# Patient Record
Sex: MALE | Race: WHITE | Employment: FULL TIME | URBAN - METROPOLITAN AREA
[De-identification: names, ages, dates, MRNs, and addresses within clinical notes are randomized per-mention and may not be internally consistent; named-entity substitution may affect disease eponyms.]

---

## 2017-03-24 ENCOUNTER — HOSPITAL ENCOUNTER (OUTPATIENT)
Age: 52
Discharge: HOME | End: 2017-03-24
Payer: COMMERCIAL

## 2017-03-24 ENCOUNTER — HOSPITAL ENCOUNTER (OUTPATIENT)
Dept: GENERAL RADIOLOGY | Age: 52
Discharge: HOME | End: 2017-03-24
Payer: COMMERCIAL

## 2017-03-24 DIAGNOSIS — J98.11 ATELECTASIS: ICD-10-CM

## 2017-03-24 DIAGNOSIS — M16.11 OSTEOARTHRITIS OF RIGHT HIP, UNSPECIFIED OSTEOARTHRITIS TYPE: ICD-10-CM

## 2017-03-24 DIAGNOSIS — M47.816 OSTEOARTHRITIS OF LUMBAR SPINE, UNSPECIFIED SPINAL OSTEOARTHRITIS COMPLICATION STATUS: ICD-10-CM

## 2017-03-24 LAB
ESTIMATED AVERAGE GLUCOSE: 111 MG/DL
HBA1C MFR BLD: 5.5 % (ref 4–6)
INSULIN COMMENT: NORMAL
INSULIN REFERENCE RANGE:: NORMAL
INSULIN: 21.4 MU/L
PROSTATE SPECIFIC ANTIGEN: 0.43 UG/L
THYROXINE, FREE: 1.4 NG/DL (ref 0.93–1.7)
TSH SERPL DL<=0.05 MIU/L-ACNC: 1.42 MIU/L (ref 0.3–5)

## 2017-03-24 PROCEDURE — 72100 X-RAY EXAM L-S SPINE 2/3 VWS: CPT

## 2017-03-24 PROCEDURE — 36415 COLL VENOUS BLD VENIPUNCTURE: CPT

## 2017-03-24 PROCEDURE — 84443 ASSAY THYROID STIM HORMONE: CPT

## 2017-03-24 PROCEDURE — 73502 X-RAY EXAM HIP UNI 2-3 VIEWS: CPT

## 2017-03-24 PROCEDURE — 83525 ASSAY OF INSULIN: CPT

## 2017-03-24 PROCEDURE — 71020 XR CHEST STANDARD TWO VW: CPT

## 2017-03-24 PROCEDURE — 83036 HEMOGLOBIN GLYCOSYLATED A1C: CPT

## 2017-03-24 PROCEDURE — 84439 ASSAY OF FREE THYROXINE: CPT

## 2017-03-24 PROCEDURE — 84153 ASSAY OF PSA TOTAL: CPT

## 2017-05-18 ENCOUNTER — HOSPITAL ENCOUNTER (OUTPATIENT)
Age: 52
Setting detail: OUTPATIENT SURGERY
Discharge: HOME | End: 2017-05-18
Attending: SURGERY | Admitting: SURGERY
Payer: COMMERCIAL

## 2017-05-18 VITALS
BODY MASS INDEX: 43.9 KG/M2 | OXYGEN SATURATION: 97 % | TEMPERATURE: 98.6 F | DIASTOLIC BLOOD PRESSURE: 72 MMHG | RESPIRATION RATE: 16 BRPM | HEIGHT: 70 IN | WEIGHT: 306.66 LBS | SYSTOLIC BLOOD PRESSURE: 124 MMHG | HEART RATE: 68 BPM

## 2017-05-18 PROCEDURE — 6360000002 HC RX W HCPCS: Performed by: SURGERY

## 2017-05-18 PROCEDURE — 99152 MOD SED SAME PHYS/QHP 5/>YRS: CPT | Performed by: SURGERY

## 2017-05-18 PROCEDURE — 88305 TISSUE EXAM BY PATHOLOGIST: CPT

## 2017-05-18 PROCEDURE — 2580000003 HC RX 258: Performed by: SURGERY

## 2017-05-18 PROCEDURE — 99153 MOD SED SAME PHYS/QHP EA: CPT | Performed by: SURGERY

## 2017-05-18 PROCEDURE — 3609010300 HC COLONOSCOPY W/BIOPSY SINGLE/MULTIPLE: Performed by: SURGERY

## 2017-05-18 PROCEDURE — 7100000010 HC PHASE II RECOVERY - FIRST 15 MIN: Performed by: SURGERY

## 2017-05-18 PROCEDURE — 7100000011 HC PHASE II RECOVERY - ADDTL 15 MIN: Performed by: SURGERY

## 2017-05-18 RX ORDER — FENTANYL CITRATE 50 UG/ML
INJECTION, SOLUTION INTRAMUSCULAR; INTRAVENOUS PRN
Status: DISCONTINUED | OUTPATIENT
Start: 2017-05-18 | End: 2017-05-18 | Stop reason: HOSPADM

## 2017-05-18 RX ORDER — MIDAZOLAM HYDROCHLORIDE 1 MG/ML
INJECTION INTRAMUSCULAR; INTRAVENOUS PRN
Status: DISCONTINUED | OUTPATIENT
Start: 2017-05-18 | End: 2017-05-18 | Stop reason: HOSPADM

## 2017-05-18 RX ORDER — SODIUM CHLORIDE, SODIUM LACTATE, POTASSIUM CHLORIDE, CALCIUM CHLORIDE 600; 310; 30; 20 MG/100ML; MG/100ML; MG/100ML; MG/100ML
INJECTION, SOLUTION INTRAVENOUS ONCE
Status: COMPLETED | OUTPATIENT
Start: 2017-05-18 | End: 2017-05-18

## 2017-05-18 RX ADMIN — SODIUM CHLORIDE, POTASSIUM CHLORIDE, SODIUM LACTATE AND CALCIUM CHLORIDE: 600; 310; 30; 20 INJECTION, SOLUTION INTRAVENOUS at 08:48

## 2017-05-18 ASSESSMENT — PAIN - FUNCTIONAL ASSESSMENT: PAIN_FUNCTIONAL_ASSESSMENT: 0-10

## 2017-05-19 LAB — SURGICAL PATHOLOGY REPORT: NORMAL

## 2017-06-20 ENCOUNTER — HOSPITAL ENCOUNTER (OUTPATIENT)
Age: 52
Discharge: HOME | End: 2017-06-20
Payer: COMMERCIAL

## 2017-06-20 PROCEDURE — 82397 CHEMILUMINESCENT ASSAY: CPT

## 2017-06-20 PROCEDURE — 86140 C-REACTIVE PROTEIN: CPT

## 2017-06-20 PROCEDURE — 88350 IMFLUOR EA ADDL 1ANTB STN PX: CPT

## 2017-06-20 PROCEDURE — 81479 UNLISTED MOLECULAR PATHOLOGY: CPT

## 2017-06-20 PROCEDURE — 83520 IMMUNOASSAY QUANT NOS NONAB: CPT

## 2017-06-20 PROCEDURE — 88346 IMFLUOR 1ST 1ANTB STAIN PX: CPT

## 2017-06-20 PROCEDURE — 36415 COLL VENOUS BLD VENIPUNCTURE: CPT

## 2017-06-23 LAB — IBD PANEL: NORMAL

## 2017-07-11 ENCOUNTER — HOSPITAL ENCOUNTER (OUTPATIENT)
Dept: CT IMAGING | Age: 52
Discharge: HOME | End: 2017-07-11
Payer: COMMERCIAL

## 2017-07-11 DIAGNOSIS — K52.9 COLITIS: ICD-10-CM

## 2017-07-11 PROCEDURE — 6360000004 HC RX CONTRAST MEDICATION: Performed by: SURGERY

## 2017-07-11 PROCEDURE — 74177 CT ABD & PELVIS W/CONTRAST: CPT

## 2017-07-11 RX ADMIN — IOVERSOL 125 ML: 741 INJECTION INTRA-ARTERIAL; INTRAVENOUS at 14:12

## 2018-04-13 ENCOUNTER — HOSPITAL ENCOUNTER (OUTPATIENT)
Age: 53
Discharge: HOME | End: 2018-04-13
Payer: COMMERCIAL

## 2018-04-13 LAB
ABSOLUTE EOS #: 0.4 K/UL (ref 0–0.4)
ABSOLUTE IMMATURE GRANULOCYTE: ABNORMAL K/UL (ref 0–0.3)
ABSOLUTE LYMPH #: 2.3 K/UL (ref 1–4.8)
ABSOLUTE MONO #: 0.6 K/UL (ref 0.2–0.8)
ALT SERPL-CCNC: 33 U/L (ref 5–41)
ANION GAP SERPL CALCULATED.3IONS-SCNC: 13 MMOL/L (ref 9–17)
AST SERPL-CCNC: 19 U/L
BASOPHILS # BLD: 0 % (ref 0–2)
BASOPHILS ABSOLUTE: 0 K/UL (ref 0–0.2)
BUN BLDV-MCNC: 16 MG/DL (ref 6–20)
BUN/CREAT BLD: 18 (ref 9–20)
CALCIUM SERPL-MCNC: 9.3 MG/DL (ref 8.6–10.4)
CHLORIDE BLD-SCNC: 103 MMOL/L (ref 98–107)
CHOLESTEROL, FASTING: 128 MG/DL
CHOLESTEROL/HDL RATIO: 4
CO2: 24 MMOL/L (ref 20–31)
CREAT SERPL-MCNC: 0.87 MG/DL (ref 0.7–1.2)
DIFFERENTIAL TYPE: ABNORMAL
EOSINOPHILS RELATIVE PERCENT: 5 % (ref 1–4)
GFR AFRICAN AMERICAN: >60 ML/MIN
GFR NON-AFRICAN AMERICAN: >60 ML/MIN
GFR SERPL CREATININE-BSD FRML MDRD: ABNORMAL ML/MIN/{1.73_M2}
GFR SERPL CREATININE-BSD FRML MDRD: ABNORMAL ML/MIN/{1.73_M2}
GLUCOSE FASTING: 106 MG/DL (ref 70–99)
HCT VFR BLD CALC: 46.4 % (ref 41–53)
HDLC SERPL-MCNC: 32 MG/DL
HEMOGLOBIN: 15.4 G/DL (ref 13.5–17.5)
IMMATURE GRANULOCYTES: ABNORMAL %
LDL CHOLESTEROL: 71 MG/DL (ref 0–130)
LYMPHOCYTES # BLD: 30 % (ref 24–44)
MCH RBC QN AUTO: 31.4 PG (ref 26–34)
MCHC RBC AUTO-ENTMCNC: 33.2 G/DL (ref 31–37)
MCV RBC AUTO: 94.3 FL (ref 80–100)
MONOCYTES # BLD: 7 % (ref 1–7)
NRBC AUTOMATED: ABNORMAL PER 100 WBC
PDW BLD-RTO: 13.9 % (ref 11.5–14.5)
PLATELET # BLD: 225 K/UL (ref 130–400)
PLATELET ESTIMATE: ABNORMAL
PMV BLD AUTO: 7.4 FL (ref 6–12)
POTASSIUM SERPL-SCNC: 4.2 MMOL/L (ref 3.7–5.3)
RBC # BLD: 4.92 M/UL (ref 4.5–5.9)
RBC # BLD: ABNORMAL 10*6/UL
SEG NEUTROPHILS: 58 % (ref 36–66)
SEGMENTED NEUTROPHILS ABSOLUTE COUNT: 4.6 K/UL (ref 1.8–7.7)
SODIUM BLD-SCNC: 140 MMOL/L (ref 135–144)
TRIGLYCERIDE, FASTING: 125 MG/DL
VLDLC SERPL CALC-MCNC: ABNORMAL MG/DL (ref 1–30)
WBC # BLD: 7.9 K/UL (ref 3.5–11)
WBC # BLD: ABNORMAL 10*3/UL

## 2018-04-13 PROCEDURE — 36415 COLL VENOUS BLD VENIPUNCTURE: CPT

## 2018-04-13 PROCEDURE — 85025 COMPLETE CBC W/AUTO DIFF WBC: CPT

## 2018-04-13 PROCEDURE — 80061 LIPID PANEL: CPT

## 2018-04-13 PROCEDURE — 80048 BASIC METABOLIC PNL TOTAL CA: CPT

## 2018-04-13 PROCEDURE — 84460 ALANINE AMINO (ALT) (SGPT): CPT

## 2018-04-13 PROCEDURE — 84450 TRANSFERASE (AST) (SGOT): CPT

## 2018-08-21 ENCOUNTER — HOSPITAL ENCOUNTER (OUTPATIENT)
Age: 53
Discharge: HOME | End: 2018-08-21
Payer: COMMERCIAL

## 2018-08-21 LAB — URIC ACID: 6.3 MG/DL (ref 3.4–7)

## 2018-08-21 PROCEDURE — 84550 ASSAY OF BLOOD/URIC ACID: CPT

## 2018-08-21 PROCEDURE — 36415 COLL VENOUS BLD VENIPUNCTURE: CPT

## 2018-09-04 ENCOUNTER — HOSPITAL ENCOUNTER (OUTPATIENT)
Dept: GENERAL RADIOLOGY | Age: 53
Discharge: HOME | End: 2018-09-06
Payer: COMMERCIAL

## 2018-09-04 ENCOUNTER — HOSPITAL ENCOUNTER (OUTPATIENT)
Age: 53
Discharge: HOME | End: 2018-09-06
Payer: COMMERCIAL

## 2018-09-04 ENCOUNTER — HOSPITAL ENCOUNTER (OUTPATIENT)
Age: 53
Discharge: HOME | End: 2018-09-04
Payer: COMMERCIAL

## 2018-09-04 DIAGNOSIS — M54.5 LOW BACK PAIN, UNSPECIFIED BACK PAIN LATERALITY, UNSPECIFIED CHRONICITY, WITH SCIATICA PRESENCE UNSPECIFIED: ICD-10-CM

## 2018-09-04 LAB
ANION GAP SERPL CALCULATED.3IONS-SCNC: 12 MMOL/L (ref 9–17)
BUN BLDV-MCNC: 16 MG/DL (ref 6–20)
BUN/CREAT BLD: 17 (ref 9–20)
CALCIUM IONIZED: 1.25 MMOL/L (ref 1.13–1.33)
CALCIUM SERPL-MCNC: 9.3 MG/DL (ref 8.6–10.4)
CHLORIDE BLD-SCNC: 100 MMOL/L (ref 98–107)
CO2: 25 MMOL/L (ref 20–31)
CREAT SERPL-MCNC: 0.92 MG/DL (ref 0.7–1.2)
GFR AFRICAN AMERICAN: >60 ML/MIN
GFR NON-AFRICAN AMERICAN: >60 ML/MIN
GFR SERPL CREATININE-BSD FRML MDRD: ABNORMAL ML/MIN/{1.73_M2}
GFR SERPL CREATININE-BSD FRML MDRD: ABNORMAL ML/MIN/{1.73_M2}
GLUCOSE BLD-MCNC: 104 MG/DL (ref 70–99)
MAGNESIUM: 1.9 MG/DL (ref 1.6–2.6)
POTASSIUM SERPL-SCNC: 4.1 MMOL/L (ref 3.7–5.3)
SODIUM BLD-SCNC: 137 MMOL/L (ref 135–144)
URIC ACID: 5.7 MG/DL (ref 3.4–7)

## 2018-09-04 PROCEDURE — 80048 BASIC METABOLIC PNL TOTAL CA: CPT

## 2018-09-04 PROCEDURE — 72220 X-RAY EXAM SACRUM TAILBONE: CPT

## 2018-09-04 PROCEDURE — 82330 ASSAY OF CALCIUM: CPT

## 2018-09-04 PROCEDURE — 36415 COLL VENOUS BLD VENIPUNCTURE: CPT

## 2018-09-04 PROCEDURE — 72100 X-RAY EXAM L-S SPINE 2/3 VWS: CPT

## 2018-09-04 PROCEDURE — 83735 ASSAY OF MAGNESIUM: CPT

## 2018-09-04 PROCEDURE — 84550 ASSAY OF BLOOD/URIC ACID: CPT

## 2018-09-10 ENCOUNTER — HOSPITAL ENCOUNTER (OUTPATIENT)
Age: 53
Discharge: HOME | End: 2018-09-10
Payer: COMMERCIAL

## 2018-09-10 LAB
RHEUMATOID FACTOR: <10 IU/ML
SEDIMENTATION RATE, ERYTHROCYTE: 110 MM (ref 0–10)

## 2018-09-10 PROCEDURE — 86038 ANTINUCLEAR ANTIBODIES: CPT

## 2018-09-10 PROCEDURE — 86431 RHEUMATOID FACTOR QUANT: CPT

## 2018-09-10 PROCEDURE — 36415 COLL VENOUS BLD VENIPUNCTURE: CPT

## 2018-09-10 PROCEDURE — 85651 RBC SED RATE NONAUTOMATED: CPT

## 2018-09-11 LAB — ANTI-NUCLEAR ANTIBODY (ANA): POSITIVE

## 2020-08-03 ENCOUNTER — HOSPITAL ENCOUNTER (OUTPATIENT)
Age: 55
Discharge: HOME | End: 2020-08-03
Payer: COMMERCIAL

## 2020-08-03 LAB
ABSOLUTE EOS #: 0.46 K/UL (ref 0–0.44)
ABSOLUTE IMMATURE GRANULOCYTE: 0.03 K/UL (ref 0–0.3)
ABSOLUTE LYMPH #: 3.04 K/UL (ref 1.1–3.7)
ABSOLUTE MONO #: 0.78 K/UL (ref 0.1–1.2)
ALT SERPL-CCNC: 30 U/L (ref 5–41)
ANION GAP SERPL CALCULATED.3IONS-SCNC: 13 MMOL/L (ref 9–17)
AST SERPL-CCNC: 23 U/L
BASOPHILS # BLD: 1 % (ref 0–2)
BASOPHILS ABSOLUTE: 0.06 K/UL (ref 0–0.2)
BUN BLDV-MCNC: 13 MG/DL (ref 6–20)
BUN/CREAT BLD: NORMAL (ref 9–20)
CALCIUM SERPL-MCNC: 8.9 MG/DL (ref 8.6–10.4)
CHLORIDE BLD-SCNC: 101 MMOL/L (ref 98–107)
CHOLESTEROL, FASTING: 130 MG/DL
CHOLESTEROL/HDL RATIO: 3.7
CO2: 24 MMOL/L (ref 20–31)
CREAT SERPL-MCNC: 0.85 MG/DL (ref 0.7–1.2)
DIFFERENTIAL TYPE: ABNORMAL
EOSINOPHILS RELATIVE PERCENT: 5 % (ref 1–4)
GFR AFRICAN AMERICAN: >60 ML/MIN
GFR NON-AFRICAN AMERICAN: >60 ML/MIN
GFR SERPL CREATININE-BSD FRML MDRD: NORMAL ML/MIN/{1.73_M2}
GFR SERPL CREATININE-BSD FRML MDRD: NORMAL ML/MIN/{1.73_M2}
GLUCOSE BLD-MCNC: 86 MG/DL (ref 70–99)
HCT VFR BLD CALC: 45.7 % (ref 40.7–50.3)
HDLC SERPL-MCNC: 35 MG/DL
HEMOGLOBIN: 14.9 G/DL (ref 13–17)
IMMATURE GRANULOCYTES: 0 %
LDL CHOLESTEROL: 69 MG/DL (ref 0–130)
LYMPHOCYTES # BLD: 35 % (ref 24–43)
MCH RBC QN AUTO: 31.4 PG (ref 25.2–33.5)
MCHC RBC AUTO-ENTMCNC: 32.6 G/DL (ref 28.4–34.8)
MCV RBC AUTO: 96.2 FL (ref 82.6–102.9)
MONOCYTES # BLD: 9 % (ref 3–12)
NRBC AUTOMATED: 0 PER 100 WBC
PDW BLD-RTO: 13.3 % (ref 11.8–14.4)
PLATELET # BLD: 231 K/UL (ref 138–453)
PLATELET ESTIMATE: ABNORMAL
PMV BLD AUTO: 9.7 FL (ref 8.1–13.5)
POTASSIUM SERPL-SCNC: 4.1 MMOL/L (ref 3.7–5.3)
RBC # BLD: 4.75 M/UL (ref 4.21–5.77)
RBC # BLD: ABNORMAL 10*6/UL
SEG NEUTROPHILS: 50 % (ref 36–65)
SEGMENTED NEUTROPHILS ABSOLUTE COUNT: 4.33 K/UL (ref 1.5–8.1)
SODIUM BLD-SCNC: 138 MMOL/L (ref 135–144)
TRIGLYCERIDE, FASTING: 132 MG/DL
VLDLC SERPL CALC-MCNC: ABNORMAL MG/DL (ref 1–30)
WBC # BLD: 8.7 K/UL (ref 3.5–11.3)
WBC # BLD: ABNORMAL 10*3/UL

## 2020-08-03 PROCEDURE — 84450 TRANSFERASE (AST) (SGOT): CPT

## 2020-08-03 PROCEDURE — 80061 LIPID PANEL: CPT

## 2020-08-03 PROCEDURE — 84460 ALANINE AMINO (ALT) (SGPT): CPT

## 2020-08-03 PROCEDURE — 80048 BASIC METABOLIC PNL TOTAL CA: CPT

## 2020-08-03 PROCEDURE — 85025 COMPLETE CBC W/AUTO DIFF WBC: CPT

## 2020-08-03 PROCEDURE — 36415 COLL VENOUS BLD VENIPUNCTURE: CPT

## 2020-10-26 ENCOUNTER — NURSE TRIAGE (OUTPATIENT)
Dept: OTHER | Facility: CLINIC | Age: 55
End: 2020-10-26

## 2020-10-26 ENCOUNTER — TELEPHONE (OUTPATIENT)
Dept: ADMINISTRATIVE | Age: 55
End: 2020-10-26

## 2020-10-26 NOTE — TELEPHONE ENCOUNTER
Call received through Covid-19 hot line. Patient called in with concern for COVID, onset of symptoms 10/20/20, see triage assessment below. Care Advice provided; patient acknowledged understanding of care advice and is in agreement with plan. Patient has no further questions; instructed to call back for any new or worsening symptoms. Teams message sent to [facility] [place], [place] to schedule appointment. Attention Provider: Thank you for allowing me to participate in the care of your patient. Please do not respond through this encounter as the response is not directed to a shared pool. Reason for Disposition   HIGH RISK patient (e.g., age > 59 years, diabetes, heart or lung disease, weak immune system) (Exception: has already been evaluated by healthcare provider and has no new or worsening symptoms)    Answer Assessment - Initial Assessment Questions  1. COVID-19 DIAGNOSIS: \"Who made your Coronavirus (COVID-19) diagnosis? \" \"Was it confirmed by a positive lab test?\" If not diagnosed by a HCP, ask \"Are there lots of cases (community spread) where you live? \" (See public health department website, if unsure)     Patient has not been tested    2. ONSET: \"When did the COVID-19 symptoms start? \"      10/20/20    3. WORST SYMPTOM: \"What is your worst symptom? \" (e.g., cough, fever, shortness of breath, muscle aches)     Hot and cold spells, with fever    4. COUGH: \"Do you have a cough? \" If so, ask: \"How bad is the cough? \"       Moderate  productive cough, white sputum    5. FEVER: \"Do you have a fever? \" If so, ask: \"What is your temperature, how was it measured, and when did it start? \"      Oral temperature, 100.0 on 10/26/20    6. RESPIRATORY STATUS: \"Describe your breathing? \" (e.g., shortness of breath, wheezing, unable to speak)       Normal    7. BETTER-SAME-WORSE: Beth Mercado you getting better, staying the same or getting worse compared to yesterday? \"  If getting worse, ask, \"In what way? \"     Better    8.  HIGH RISK DISEASE: \"Do you have any chronic medical problems? \" (e.g., asthma, heart or lung disease, weak immune system, etc.)       Sleep Apnea, arrythmia    9. PREGNANCY: \"Is there any chance you are pregnant? \" \"When was your last menstrual period? \"      NA    10. OTHER SYMPTOMS: \"Do you have any other symptoms? \"  (e.g., chills, fatigue, headache, loss of smell or taste, muscle pain, sore throat)       Chills, fatigue, fever, productive cough    Protocols used: CORONAVIRUS (COVID-19) DIAGNOSED OR SUSPECTED-ADULT-[place]

## 2021-11-09 ENCOUNTER — HOSPITAL ENCOUNTER (OUTPATIENT)
Age: 56
Discharge: HOME | End: 2021-11-09
Payer: COMMERCIAL

## 2021-11-09 LAB
ABSOLUTE EOS #: 0.29 K/UL (ref 0–0.44)
ABSOLUTE IMMATURE GRANULOCYTE: <0.03 K/UL (ref 0–0.3)
ABSOLUTE LYMPH #: 1.83 K/UL (ref 1.1–3.7)
ABSOLUTE MONO #: 0.51 K/UL (ref 0.1–1.2)
ALT SERPL-CCNC: 19 U/L (ref 5–41)
ANION GAP SERPL CALCULATED.3IONS-SCNC: 16 MMOL/L (ref 9–17)
AST SERPL-CCNC: 19 U/L
BASOPHILS # BLD: 1 % (ref 0–2)
BASOPHILS ABSOLUTE: 0.04 K/UL (ref 0–0.2)
BUN BLDV-MCNC: 16 MG/DL (ref 6–20)
BUN/CREAT BLD: ABNORMAL (ref 9–20)
CALCIUM SERPL-MCNC: 9.3 MG/DL (ref 8.6–10.4)
CHLORIDE BLD-SCNC: 104 MMOL/L (ref 98–107)
CHOLESTEROL, FASTING: 242 MG/DL
CHOLESTEROL/HDL RATIO: 6.1
CO2: 19 MMOL/L (ref 20–31)
CREAT SERPL-MCNC: 0.83 MG/DL (ref 0.7–1.2)
DIFFERENTIAL TYPE: ABNORMAL
EOSINOPHILS RELATIVE PERCENT: 5 % (ref 1–4)
ESTIMATED AVERAGE GLUCOSE: 103 MG/DL
GFR AFRICAN AMERICAN: >60 ML/MIN
GFR NON-AFRICAN AMERICAN: >60 ML/MIN
GFR SERPL CREATININE-BSD FRML MDRD: ABNORMAL ML/MIN/{1.73_M2}
GFR SERPL CREATININE-BSD FRML MDRD: ABNORMAL ML/MIN/{1.73_M2}
GLUCOSE BLD-MCNC: 103 MG/DL (ref 70–99)
HBA1C MFR BLD: 5.2 % (ref 4–6)
HCT VFR BLD CALC: 45.6 % (ref 40.7–50.3)
HDLC SERPL-MCNC: 40 MG/DL
HEMOGLOBIN: 15.6 G/DL (ref 13–17)
IMMATURE GRANULOCYTES: 0 %
LDL CHOLESTEROL: 178 MG/DL (ref 0–130)
LYMPHOCYTES # BLD: 31 % (ref 24–43)
MCH RBC QN AUTO: 32.1 PG (ref 25.2–33.5)
MCHC RBC AUTO-ENTMCNC: 34.2 G/DL (ref 28.4–34.8)
MCV RBC AUTO: 93.8 FL (ref 82.6–102.9)
MONOCYTES # BLD: 9 % (ref 3–12)
NRBC AUTOMATED: 0 PER 100 WBC
PDW BLD-RTO: 13 % (ref 11.8–14.4)
PLATELET # BLD: 211 K/UL (ref 138–453)
PLATELET ESTIMATE: ABNORMAL
PMV BLD AUTO: 9.5 FL (ref 8.1–13.5)
POTASSIUM SERPL-SCNC: 4.1 MMOL/L (ref 3.7–5.3)
RBC # BLD: 4.86 M/UL (ref 4.21–5.77)
RBC # BLD: ABNORMAL 10*6/UL
SEG NEUTROPHILS: 54 % (ref 36–65)
SEGMENTED NEUTROPHILS ABSOLUTE COUNT: 3.22 K/UL (ref 1.5–8.1)
SODIUM BLD-SCNC: 139 MMOL/L (ref 135–144)
TRIGLYCERIDE, FASTING: 119 MG/DL
TSH SERPL DL<=0.05 MIU/L-ACNC: 1.49 MIU/L (ref 0.3–5)
VITAMIN D 25-HYDROXY: 19.6 NG/ML (ref 30–100)
VLDLC SERPL CALC-MCNC: ABNORMAL MG/DL (ref 1–30)
WBC # BLD: 5.9 K/UL (ref 3.5–11.3)
WBC # BLD: ABNORMAL 10*3/UL

## 2021-11-09 PROCEDURE — 82306 VITAMIN D 25 HYDROXY: CPT

## 2021-11-09 PROCEDURE — 85025 COMPLETE CBC W/AUTO DIFF WBC: CPT

## 2021-11-09 PROCEDURE — 84460 ALANINE AMINO (ALT) (SGPT): CPT

## 2021-11-09 PROCEDURE — 80048 BASIC METABOLIC PNL TOTAL CA: CPT

## 2021-11-09 PROCEDURE — 84443 ASSAY THYROID STIM HORMONE: CPT

## 2021-11-09 PROCEDURE — 83036 HEMOGLOBIN GLYCOSYLATED A1C: CPT

## 2021-11-09 PROCEDURE — 84450 TRANSFERASE (AST) (SGOT): CPT

## 2021-11-09 PROCEDURE — 80061 LIPID PANEL: CPT

## 2021-11-09 PROCEDURE — 36415 COLL VENOUS BLD VENIPUNCTURE: CPT

## 2022-06-01 DIAGNOSIS — M25.562 LEFT KNEE PAIN, UNSPECIFIED CHRONICITY: Primary | ICD-10-CM

## 2022-06-01 NOTE — PROGRESS NOTES
[address] [facility]  [address] [place] [address]  Dept: [phone]  Dept Fax: [phone]          Left Knee - New Patient     Subjective:     Chief Complaint   Patient presents with    Knee Pain     Left Knee Pain     HPI:     Abiel Lynne presents today for Left knee pain. Occupation: contractor. The pain has been present since 5/14/202. He states he was putting in a deck and some floors back to back and he began having left knee pain. The patient has tried heat, ice, brace, rest, Tylenol with mild improvement. The pain is now described as Achy and Dull. There is  pain on weight bearing. The knee has swelled. There is  painful popping and clicking. The knee has caught or locked up. The knee has not given out. It is  stiff upon arising from sitting. It is  painful to go up and down stairs and sit for a prolonged time. The patient has not had a cortisone injection. The patient has not tried a lubrication injection. The patient has not tried physical therapy. The patient has not had surgery. ROS:   Review of Systems   Constitutional: Positive for activity change. Negative for appetite change, fatigue and fever. Respiratory: Negative. Negative for apnea, cough, chest tightness and shortness of breath. Cardiovascular: Negative. Negative for chest pain, palpitations and leg swelling. Gastrointestinal: Negative for abdominal distention, abdominal pain, constipation, diarrhea, nausea and vomiting. Genitourinary: Negative for difficulty urinating, dysuria and hematuria. Musculoskeletal: Positive for arthralgias, gait problem and joint swelling. Negative for myalgias. Skin: Negative for color change and rash. Neurological: Negative for dizziness, weakness, numbness and headaches. Psychiatric/Behavioral: Positive for sleep disturbance.        Past Medical History:    Past Medical History:   Diagnosis Date    Arrhythmia     Arthritis     Hyperlipidemia     Hypertension     Kidney stone     Sleep apnea        Past Surgical History:    Past Surgical History:   Procedure Laterality Date    COLONOSCOPY  05/18/2017    HERNIA REPAIR      LITHOTRIPSY      TN COLONOSCOPY W/BIOPSY SINGLE/MULTIPLE N/A 5/18/2017    [facility]SY [facility]:   Current Outpatient Medications   Medication Sig Dispense Refill    metoprolol (TOPROL-XL) 25 MG XL tablet Take 25 mg by mouth daily      atorvastatin (LIPITOR) 40 MG tablet Take 40 mg by mouth daily      aspirin 325 MG tablet Take 325 mg by mouth daily      ibuprofen (ADVIL;MOTRIN) 600 MG tablet Take 1 tablet by mouth every 6 hours as needed for Pain 30 tablet 0     No current facility-administered medications for this visit. Allergies:    Patient has no known allergies. Social History:   Social History     Socioeconomic History    Marital status:      Spouse name: Not on file    Number of children: Not on file    Years of education: Not on file    Highest education level: Not on file   Occupational History    Not on file   Tobacco Use    Smoking status: Never Smoker    Smokeless tobacco: Not on file   Substance and Sexual Activity    Alcohol use: Yes    Drug use: No    Sexual activity: Not on file   Other Topics Concern    Not on file   Social History Narrative    Not on file     Social Determinants of Health     Financial Resource Strain:     Difficulty of Paying Living Expenses: Not on file   Food Insecurity:     Worried About Running Out of Food in the Last Year: Not on file    Óscar of Food in the Last Year: Not on file   Transportation Needs:     Lack of Transportation (Medical): Not on file    Lack of Transportation (Non-Medical):  Not on file   Physical Activity:     Days of Exercise per Week: Not on file    Minutes of Exercise per Session: Not on file   Stress:     Feeling of Stress : Not on file   Social Connections:     Frequency of Communication with Friends and Family: Not on file    Frequency of Social Gatherings with Friends and Family: Not on file    Attends Mandaen Services: Not on file    Active Member of Clubs or Organizations: Not on file    Attends Club or Organization Meetings: Not on file    Marital Status: Not on file   Intimate Partner Violence:     Fear of Current or Ex-Partner: Not on file    Emotionally Abused: Not on file    Physically Abused: Not on file    Sexually Abused: Not on file   Housing Stability:     Unable to Pay for Housing in the Last Year: Not on file    Number of [place] in the Last Year: Not on file    Unstable Housing in the Last Year: Not on file       Family History:  No family history on file. Vitals:   /62   Pulse 66   Resp 14   Ht 5' 10\" (1.778 m)   Wt 272 lb (123.4 kg)   BMI 39.03 kg/m²  Body mass index is 39.03 kg/m². Physical Examination:     Orthopedics:    GENERAL: Alert and oriented X3 in no acute distress. SKIN: Intact without lesions or ulcerations. NEURO: Intact to sensory and motor testing. VASC: Capillary refill is less than 3 seconds. KNEE EXAM    LOCATION: Left Knee  GEN: Alert and oriented X 3, in no acute distress. GAIT: The patient's gait was observed while entering the exam room and was noted to be antalgic. The extremity is in varus alignment. SKIN: Intact without rashes, lesions, or ulcerations. No obvious deformity or swelling. NEURO: The patient responds to light touch throughout bilateral LE. Patellar and Achilles reflexes are 2/4. VASC: The bilateral LE is neurovascularly intact with 2/4 DP and 2/4 PT pulses. Brisk capillary refill. ROM: 0/100 degrees. There is moderate effusion. MUSC: decreased quad tone  LIGAMENT: Lachman's test is Negative with Good endpoint. Anterior drawer Negative. Posterior drawer Negative.  There is No varus instability at 0 degrees and No varus instability at 30 degrees. There is No valgus instability at 0 degrees and No valgus instability at 30 degrees. SPECIAL: Prasanna test is positive with no clunks, + crepitation, and + pain. PALP: There is medial joint line pain. Assessment:     1. Chondrocalcinosis of left knee    2. Knee effusion, left      Procedures:    Procedure: yes    Joint aspiration of the left knee. The patient was placed in the supine position on the exam table. The superior lateral portal was identified and marked. The skin was prepped with betadine in a sterile fashion. Utilizing ultrasound for precise placement and clean technique 4cc's of  1% lidocaine  was injected. There was no resistance to the injection. Thereafter the skin was prepped with betadine in a sterile fashion once again and the joint was aspirated with a 60cc syringe. After aspirating the joint, 65 cc's of yellow tinged synovial fluid was removed from the knee. The wound was then cleansed and a band-aid was placed over the superior lateral portal site. The patient tolerated the procedure without difficulty. Adverse reactions to the aspiration were discussed with the patient including signs of infection (increasing pain, redness, swelling) and the patient was instructed to call immediately if experiencing any of these symptoms. Regular Knee Injection    Location: Left Knee  Procedure: I discussed in detail the risks, benefits and complications of the corticosteroid injection which included but are not limited to: infection, skin reactions, hot swollen, and anaphylaxis with the patient. Calvin Bowers verbalized understanding and they have agreed to have the corticosteroid injection into the left knee. The patient was placed in the Supine position on the exam table. The superior lateral portal was identified and marked with a ball point pen. The skin was prepped with betadine in a sterile fashion.  Utilizing a [employer] ultrasound unit with a variable frequency linear transducer and clean technique with sterile gloves, a 3 cc solution containing 2 cc of 1% lidocaine   with 1 cc containing 80 mg of Depo-medrol  was injected. There was no resistance to the injection. The wound was cleansed and a band-aid was placed. the patient tolerated the procedure without difficulty. Adverse reactions to the injection were discussed with the patient including signs of infection (increasing pain, redness, swelling) and the patient was instructed to call immediately if experiencing any of these symptoms. Images of the injection site were recorded throughout the procedure and are saved on the SD card which is stored in the [email or web address] ultrasound unit. All images were downloaded and stored in patient's chart. Radiology:   KNEE X-RAY    4 views of the left knee including AP, bilateral tunnel, and lateral in the upright position, and skyline views reveal anatomic alignment with no fracture or dislocation. Kellgren grade II/III changes of osteoarthritis (joint space narrowing, osteophyte, subchondral sclerosis, bony deformity/cyst) of the tricompartment(s). No osseous loose bodies. No bony erosion or periosteal reaction. No soft tissue masses. Chondrocalcinosis noted    Impression: Chondrocalcinosis of the left knee. Plan:   Treatment : I reviewed the x-ray with the patient and I informed them that the chondrocalcinosis of the left knee with degenerative changes and a large effusion. We discussed the etiologies and natural histories of chondrocalcinosis and large knee effusion. We discussed the various treatment alternatives including anti-inflammatory medications, physical therapy, injections, further imaging studies and as a last result surgery. During today's visit, we discussed that he had a large effusion in his knee which is causing most of his pain. The quickest thing I could do is an aspiration of the left knee and a cortisone injection.   We did the same thing on his right knee approximately 3 years ago when he has had no issues since. He states he has lost 60 pounds over the last couple years and is continuing to work to lose more. I encouraged that because it will help his knees and his back. I did advise that he wears kneepads anytime he is doing any work on his knees to help save them for as long as he can. I am going to give him prescription for physical therapy and some exercises in his after visit summary. If the injection works but does not last we may need to consider getting an MRI of that left knee to see how much arthritis is in there. If the injection does not work and last then we with the chondrocalcinosis we do know that there is a possibility of some degenerative meniscal tears but depending on how significant the arthritis is would determine if a knee replacement versus a knee arthroscopy would be needed. The patient has opted for a cortisone injection into the left knee to help reduce inflammation and pain. The injection site should never get red, hot, or swollen and if it does the patient will contact our office right away. The patient may experience a increase in soreness the first 24-48 hours due to a cortisone flair and can take anti-inflammatories for a short period of time to reduce that soreness. The patient should not submerge the injection site in water for a minimum of 24 hours to avoid infection. This means no lakes, pools, ponds, or hot tubs for 24 hours. If the patient is diabetic the injection may increase their blood sugar for up to one week. The patient can do this cortisone injection once every 3 months as needed. If the injections stop working and do not give the patient relief the patient should consider surgical interventions to produce long term relief. . A physical therapy prescription was given. He is unable to take NSAIDs due to one of his heart medications. He can continue Tylenol for pain as needed.   Patient should return to the clinic in 6 weeks to follow up with  Tyree Kunz PA-C. If he is good he can call and cancel. The patient will call the office immediately with any problems. No orders of the defined types were placed in this encounter. Orders Placed This Encounter   Procedures    [facility]     Referral Priority:   Routine     Referral Type:   Eval and Treat     Referral Reason:   Specialty Services Required     Requested Specialty:   Physical Therapist     Number of Visits Requested:   1         This note is created with the assistance of a speech recognition program.  While intending to generate a document that actually reflects the content of the visit, the document can still have some errors including those of syntax and sound a like substitutions which may escape proof reading.   In such instances, actual meaning can be extrapolated by contextual diversion    Electronically signed by Cheryl Werner PA-C, on 6/2/2022 at 9:25 AM

## 2022-06-02 ENCOUNTER — OFFICE VISIT (OUTPATIENT)
Dept: ORTHOPEDIC SURGERY | Age: 57
End: 2022-06-02
Payer: COMMERCIAL

## 2022-06-02 VITALS
SYSTOLIC BLOOD PRESSURE: 108 MMHG | DIASTOLIC BLOOD PRESSURE: 62 MMHG | HEART RATE: 66 BPM | BODY MASS INDEX: 38.94 KG/M2 | WEIGHT: 272 LBS | RESPIRATION RATE: 14 BRPM | HEIGHT: 70 IN

## 2022-06-02 DIAGNOSIS — M25.462 KNEE EFFUSION, LEFT: ICD-10-CM

## 2022-06-02 DIAGNOSIS — M11.262 CHONDROCALCINOSIS OF LEFT KNEE: Primary | ICD-10-CM

## 2022-06-02 PROCEDURE — 20611 DRAIN/INJ JOINT/BURSA W/US: CPT | Performed by: PHYSICIAN ASSISTANT

## 2022-06-02 PROCEDURE — 99203 OFFICE O/P NEW LOW 30 MIN: CPT | Performed by: PHYSICIAN ASSISTANT

## 2022-06-02 RX ORDER — LIDOCAINE HYDROCHLORIDE 10 MG/ML
6 INJECTION, SOLUTION INFILTRATION; PERINEURAL ONCE
Status: COMPLETED | OUTPATIENT
Start: 2022-06-02 | End: 2022-06-02

## 2022-06-02 RX ORDER — METHYLPREDNISOLONE ACETATE 80 MG/ML
80 INJECTION, SUSPENSION INTRA-ARTICULAR; INTRALESIONAL; INTRAMUSCULAR; SOFT TISSUE ONCE
Status: COMPLETED | OUTPATIENT
Start: 2022-06-02 | End: 2022-06-02

## 2022-06-02 RX ADMIN — LIDOCAINE HYDROCHLORIDE 6 ML: 10 INJECTION, SOLUTION INFILTRATION; PERINEURAL at 17:31

## 2022-06-02 RX ADMIN — METHYLPREDNISOLONE ACETATE 80 MG: 80 INJECTION, SUSPENSION INTRA-ARTICULAR; INTRALESIONAL; INTRAMUSCULAR; SOFT TISSUE at 17:31

## 2022-06-02 ASSESSMENT — ENCOUNTER SYMPTOMS
VOMITING: 0
DIARRHEA: 0
ABDOMINAL DISTENTION: 0
NAUSEA: 0
ABDOMINAL PAIN: 0
APNEA: 0
CONSTIPATION: 0
SHORTNESS OF BREATH: 0
COLOR CHANGE: 0
CHEST TIGHTNESS: 0
RESPIRATORY NEGATIVE: 1
COUGH: 0

## 2022-06-02 NOTE — PATIENT INSTRUCTIONS
INJECTION CARE    The injection site should never get red, hot, or swollen and if it does the patient will contact our office right away. With a cortisone steroid injection, the patient may experience a increase in soreness the first 24-48 hours due to a cortisone flair and can take anti-inflammatories for a short period of time to reduce that soreness. With a lubrication injection, on rare occasion the patient may develop swelling and pain if having a reaction to the medication. If this happens, try an anti-inflammatory medication and ice as needed. If pain and swelling continues, call the office for further instructions. The patient should not submerge the injection site in water for a minimum of 24 hours to avoid infection. This means no lakes, pools, ponds, or hot tubs for 24 hours. If the patient is diabetic the injection may increase their blood sugar for up to one week. The patient can do this cortisone injection once every 3 months as needed and lubrication injections every 6 months. Patient Education        Knee: Exercises  Introduction  Here are some examples of exercises for you to try. The exercises may be suggested for a condition or for rehabilitation. Start each exercise slowly. Ease off the exercises if you start to have pain. You will be told when to start these exercises and which ones will work bestfor you. How to do the exercises  Quad sets    1. Sit with your leg straight and supported on the floor or a firm bed. (If you feel discomfort in the front or back of your knee, place a small towel roll under your knee.)  2. Tighten the muscles on top of your thigh by pressing the back of your knee flat down to the floor. (If you feel discomfort under your kneecap, place a small towel roll under your knee.)  3. Hold for about 6 seconds, then rest for up to 10 seconds. 4. Do 8 to 12 repetitions several times a day. Straight-leg raises to the front    1.  Lie on your back with your good knee bent so that your foot rests flat on the floor. Your injured leg should be straight. Make sure that your low back has a normal curve. You should be able to slip your flat hand in between the floor and the small of your back, with your palm touching the floor and your back touching the back of your hand. 2. Tighten the thigh muscles in the injured leg by pressing the back of your knee flat down to the floor. Hold your knee straight. 3. Keeping the thigh muscles tight, lift your injured leg up so that your heel is about 12 inches off the floor. Hold for about 6 seconds and then lower slowly. 4. Do 8 to 12 repetitions, 3 times a day. Straight-leg raises to the outside    1. Lie on your side, with your injured leg on top. 2. Tighten the front thigh muscles of your injured leg to keep your knee straight. 3. Keep your hip and your leg straight in line with the rest of your body, and keep your knee pointing forward. Do not drop your hip back. 4. Lift your injured leg straight up toward the ceiling, about 12 inches off the floor. Hold for about 6 seconds, then slowly lower your leg. 5. Do 8 to 12 repetitions. Straight-leg raises to the back    1. Lie on your stomach, and lift your leg straight up behind you (toward the ceiling). 2. Lift your toes about 6 inches off the floor, hold for about 6 seconds, then lower slowly. 3. Do 8 to 12 repetitions. Straight-leg raises to the inside    1. Lie on the side of your body with the injured leg. 2. You can either prop your other (good) leg up on a chair, or you can bend your good knee and put that foot in front of your injured knee. Do not drop your hip back. 3. Tighten the muscles on the front of your thigh to straighten your injured knee. 4. Keep your kneecap pointing forward, and lift your whole leg up toward the ceiling about 6 inches. Hold for about 6 seconds, then lower slowly. 5. Do 8 to 12 repetitions. Heel dig bridging    1.  Lie on your back with both knees bent and your ankles bent so that only your heels are digging into the floor. Your knees should be bent about 90 degrees. 2. Then push your heels into the floor, squeeze your buttocks, and lift your hips off the floor until your shoulders, hips, and knees are all in a straight line. 3. Hold for about 6 seconds as you continue to breathe normally, and then slowly lower your hips back down to the floor and rest for up to 10 seconds. 4. Do 8 to 12 repetitions. Hamstring curls    1. Lie on your stomach with your knees straight. If your kneecap is uncomfortable, roll up a washcloth and put it under your leg just above your kneecap. 2. Lift the foot of your injured leg by bending the knee so that you bring the foot up toward your buttock. If this motion hurts, try it without bending your knee quite as far. This may help you avoid any painful motion. 3. Slowly lower your leg back to the floor. 4. Do 8 to 12 repetitions. 5. With permission from your doctor or physical therapist, you may also want to add a cuff weight to your ankle (not more than 5 pounds). With weight, you do not have to lift your leg more than 12 inches to get a hamstring workout. Shallow standing knee bends    Do this exercise only if you have very little pain; if you have no clicking, locking, or giving way if you have an injured knee; and if it does not hurtwhile you are doing 8 to 12 repetitions. 1. Stand with your hands lightly resting on a counter or chair in front of you. Put your feet shoulder-width apart. 2. Slowly bend your knees so that you squat down like you are going to sit in a chair. Make sure your knees do not go in front of your toes. 3. Lower yourself about 6 inches. Your heels should remain on the floor at all times. 4. Rise slowly to a standing position. Heel raises    1. Stand with your feet a few inches apart, with your hands lightly resting on a counter or chair in front of you.   2. Slowly raise your heels off the floor while keeping your knees straight. 3. Hold for about 6 seconds, then slowly lower your heels to the floor. 4. Do 8 to 12 repetitions several times during the day. Follow-up care is a key part of your treatment and safety. Be sure to make and go to all appointments, and call your doctor if you are having problems. It's also a good idea to know your test results and keep alist of the medicines you take. Where can you learn more? Go to [email or web address] org and sign in to your [facility] account. Enter K340 in the 3Scan box to learn more about \"Knee: Exercises. \"     If you do not have an account, please click on the \"Sign Up Now\" link. Current as of: July 1, 2021               Content Version: 13.2  © 2871-0327 [employer]. Care instructions adapted under license by [facility] ([facility]). If you have questions about a medical condition or this instruction, always ask your healthcare professional. [address] any warranty or liability for your use of this information.

## 2022-06-22 ENCOUNTER — HOSPITAL ENCOUNTER (INPATIENT)
Age: 57
LOS: 1 days | Discharge: HOME | DRG: 343 | End: 2022-06-23
Attending: EMERGENCY MEDICINE | Admitting: INTERNAL MEDICINE
Payer: COMMERCIAL

## 2022-06-22 ENCOUNTER — APPOINTMENT (OUTPATIENT)
Dept: GENERAL RADIOLOGY | Age: 57
DRG: 343 | End: 2022-06-22
Payer: COMMERCIAL

## 2022-06-22 ENCOUNTER — APPOINTMENT (OUTPATIENT)
Dept: CT IMAGING | Age: 57
DRG: 343 | End: 2022-06-22
Payer: COMMERCIAL

## 2022-06-22 DIAGNOSIS — K35.890 OTHER ACUTE APPENDICITIS: ICD-10-CM

## 2022-06-22 DIAGNOSIS — K35.80 UNCOMPLICATED ACUTE APPENDICITIS: Primary | ICD-10-CM

## 2022-06-22 LAB
ABSOLUTE EOS #: 0.04 K/UL (ref 0–0.4)
ABSOLUTE LYMPH #: 1 K/UL (ref 1–4.8)
ABSOLUTE MONO #: 0.96 K/UL (ref 0.2–0.8)
ALBUMIN SERPL-MCNC: 4.1 G/DL (ref 3.5–5.2)
ALP BLD-CCNC: 60 U/L (ref 40–129)
ALT SERPL-CCNC: 17 U/L (ref 5–41)
ANION GAP SERPL CALCULATED.3IONS-SCNC: 12 MMOL/L
AST SERPL-CCNC: 17 U/L
BASOPHILS # BLD: 0 % (ref 0–2)
BASOPHILS ABSOLUTE: 0.02 K/UL (ref 0–0.2)
BILIRUB SERPL-MCNC: 1.43 MG/DL (ref 0.3–1.2)
BILIRUBIN DIRECT: 0.27 MG/DL
BILIRUBIN, INDIRECT: 1.16 MG/DL (ref 0–1)
BUN BLDV-MCNC: 18 MG/DL (ref 6–20)
BUN/CREAT BLD: 23 (ref 9–20)
CALCIUM SERPL-MCNC: 8.7 MG/DL (ref 8.6–10.4)
CHLORIDE BLD-SCNC: 106 MMOL/L (ref 98–107)
CO2: 19 MMOL/L (ref 20–31)
CREAT SERPL-MCNC: 0.79 MG/DL (ref 0.7–1.2)
EOSINOPHILS RELATIVE PERCENT: 1 % (ref 1–4)
GFR AFRICAN AMERICAN: >60 ML/MIN
GFR NON-AFRICAN AMERICAN: >60 ML/MIN
GFR SERPL CREATININE-BSD FRML MDRD: ABNORMAL ML/MIN/{1.73_M2}
GLUCOSE BLD-MCNC: 93 MG/DL (ref 70–99)
HCT VFR BLD CALC: 47 % (ref 41–53)
HEMOGLOBIN: 14.6 G/DL (ref 13.5–17.5)
LACTIC ACID: 1.1 MMOL/L (ref 0.5–2.2)
LIPASE: 20 U/L (ref 13–60)
LYMPHOCYTES # BLD: 7 % (ref 24–44)
MAGNESIUM: 1.7 MG/DL (ref 1.6–2.6)
MCH RBC QN AUTO: 31.1 PG (ref 26–34)
MCHC RBC AUTO-ENTMCNC: 31.1 G/DL (ref 31–37)
MCV RBC AUTO: 100.2 FL (ref 80–100)
MONOCYTES # BLD: 7 % (ref 1–7)
PDW BLD-RTO: 13.3 % (ref 11.5–14.5)
PLATELET # BLD: 174 K/UL (ref 130–400)
POTASSIUM SERPL-SCNC: 4.2 MMOL/L (ref 3.7–5.3)
PRO-BNP: 41 PG/ML
RBC # BLD: 4.69 M/UL (ref 4.5–5.9)
SEG NEUTROPHILS: 85 % (ref 36–66)
SEGMENTED NEUTROPHILS ABSOLUTE COUNT: 11.31 K/UL (ref 1.8–7.7)
SODIUM BLD-SCNC: 137 MMOL/L (ref 135–144)
TOTAL PROTEIN: 7.6 G/DL (ref 6.4–8.3)
TROPONIN, HIGH SENSITIVITY: 6 NG/L (ref 0–22)
TROPONIN, HIGH SENSITIVITY: <6 NG/L (ref 0–22)
WBC # BLD: 13.4 K/UL (ref 3.5–11)

## 2022-06-22 PROCEDURE — 36415 COLL VENOUS BLD VENIPUNCTURE: CPT

## 2022-06-22 PROCEDURE — 83880 ASSAY OF NATRIURETIC PEPTIDE: CPT

## 2022-06-22 PROCEDURE — 80076 HEPATIC FUNCTION PANEL: CPT

## 2022-06-22 PROCEDURE — 83605 ASSAY OF LACTIC ACID: CPT

## 2022-06-22 PROCEDURE — 87040 BLOOD CULTURE FOR BACTERIA: CPT

## 2022-06-22 PROCEDURE — 2060000000 HC ICU INTERMEDIATE R&B

## 2022-06-22 PROCEDURE — 2580000003 HC RX 258: Performed by: NURSE PRACTITIONER

## 2022-06-22 PROCEDURE — 99285 EMERGENCY DEPT VISIT HI MDM: CPT

## 2022-06-22 PROCEDURE — 84484 ASSAY OF TROPONIN QUANT: CPT

## 2022-06-22 PROCEDURE — 71045 X-RAY EXAM CHEST 1 VIEW: CPT

## 2022-06-22 PROCEDURE — 83735 ASSAY OF MAGNESIUM: CPT

## 2022-06-22 PROCEDURE — 74176 CT ABD & PELVIS W/O CONTRAST: CPT

## 2022-06-22 PROCEDURE — 6360000002 HC RX W HCPCS: Performed by: NURSE PRACTITIONER

## 2022-06-22 PROCEDURE — 85025 COMPLETE CBC W/AUTO DIFF WBC: CPT

## 2022-06-22 PROCEDURE — 83690 ASSAY OF LIPASE: CPT

## 2022-06-22 PROCEDURE — 93005 ELECTROCARDIOGRAM TRACING: CPT | Performed by: EMERGENCY MEDICINE

## 2022-06-22 PROCEDURE — 80048 BASIC METABOLIC PNL TOTAL CA: CPT

## 2022-06-22 RX ORDER — SODIUM CHLORIDE 0.9 % (FLUSH) 0.9 %
10 SYRINGE (ML) INJECTION PRN
Status: DISCONTINUED | OUTPATIENT
Start: 2022-06-22 | End: 2022-06-23 | Stop reason: HOSPADM

## 2022-06-22 RX ORDER — MORPHINE SULFATE 2 MG/ML
2 INJECTION, SOLUTION INTRAMUSCULAR; INTRAVENOUS EVERY 4 HOURS PRN
Status: DISCONTINUED | OUTPATIENT
Start: 2022-06-22 | End: 2022-06-23 | Stop reason: DRUGHIGH

## 2022-06-22 RX ORDER — MORPHINE SULFATE 4 MG/ML
4 INJECTION, SOLUTION INTRAMUSCULAR; INTRAVENOUS EVERY 4 HOURS PRN
Status: DISCONTINUED | OUTPATIENT
Start: 2022-06-22 | End: 2022-06-23 | Stop reason: DRUGHIGH

## 2022-06-22 RX ORDER — SODIUM CHLORIDE 9 MG/ML
INJECTION, SOLUTION INTRAVENOUS CONTINUOUS
Status: DISCONTINUED | OUTPATIENT
Start: 2022-06-22 | End: 2022-06-23 | Stop reason: HOSPADM

## 2022-06-22 RX ORDER — SODIUM CHLORIDE 0.9 % (FLUSH) 0.9 %
5-40 SYRINGE (ML) INJECTION EVERY 12 HOURS SCHEDULED
Status: DISCONTINUED | OUTPATIENT
Start: 2022-06-22 | End: 2022-06-23 | Stop reason: SDUPTHER

## 2022-06-22 RX ORDER — ONDANSETRON 2 MG/ML
4 INJECTION INTRAMUSCULAR; INTRAVENOUS EVERY 6 HOURS PRN
Status: DISCONTINUED | OUTPATIENT
Start: 2022-06-22 | End: 2022-06-23 | Stop reason: SDUPTHER

## 2022-06-22 RX ORDER — SODIUM CHLORIDE 9 MG/ML
INJECTION, SOLUTION INTRAVENOUS CONTINUOUS
Status: DISCONTINUED | OUTPATIENT
Start: 2022-06-22 | End: 2022-06-23 | Stop reason: SDUPTHER

## 2022-06-22 RX ORDER — SODIUM CHLORIDE 9 MG/ML
INJECTION, SOLUTION INTRAVENOUS PRN
Status: DISCONTINUED | OUTPATIENT
Start: 2022-06-22 | End: 2022-06-23 | Stop reason: HOSPADM

## 2022-06-22 RX ADMIN — PIPERACILLIN AND TAZOBACTAM 4500 MG: 4; .5 INJECTION, POWDER, LYOPHILIZED, FOR SOLUTION INTRAVENOUS at 23:21

## 2022-06-22 RX ADMIN — SODIUM CHLORIDE: 9 INJECTION, SOLUTION INTRAVENOUS at 23:18

## 2022-06-22 ASSESSMENT — ENCOUNTER SYMPTOMS
ABDOMINAL PAIN: 1
DIARRHEA: 0
NAUSEA: 0
VOMITING: 0
WHEEZING: 0
CONSTIPATION: 0
SHORTNESS OF BREATH: 0
COUGH: 0
BACK PAIN: 0

## 2022-06-22 ASSESSMENT — PAIN DESCRIPTION - DESCRIPTORS: DESCRIPTORS: TENDER

## 2022-06-22 ASSESSMENT — PAIN DESCRIPTION - LOCATION: LOCATION: ABDOMEN

## 2022-06-22 ASSESSMENT — PAIN DESCRIPTION - ORIENTATION: ORIENTATION: RIGHT

## 2022-06-22 ASSESSMENT — PAIN SCALES - GENERAL: PAINLEVEL_OUTOF10: 2

## 2022-06-22 ASSESSMENT — PAIN DESCRIPTION - FREQUENCY: FREQUENCY: CONTINUOUS

## 2022-06-22 ASSESSMENT — VISUAL ACUITY: OU: 1

## 2022-06-22 ASSESSMENT — PAIN - FUNCTIONAL ASSESSMENT: PAIN_FUNCTIONAL_ASSESSMENT: 0-10

## 2022-06-22 NOTE — ED PROVIDER NOTES
[address] [place] [address] ED  EMERGENCY DEPARTMENT ENCOUNTER      Pt Name: Ney Dumont  MRN: [id number]  [place] 1965  Date of evaluation: 6/22/2022  Provider: Maricela Baumgarten, APRN - CNP    CHIEF COMPLAINT       Chief Complaint   Patient presents with    Abdominal Pain     RLQ,  onset 2 hrs    Palpitations     states hx of palpitations         HISTORY OFPRESENT ILLNESS  (Location/Symptom, Timing/Onset, Context/Setting, Quality, Duration, Modifying Factors, Severity.)   Ney Dumont is a 60 y.o. male who presents to the emergency department by private auto for evaluation of sudden onset of right lower quadrant abdominal pain 2 hours prior to arrival.  Denies fall or injury. Pain is intermittent. Moderate at times. No nausea or vomiting. No flank pain. He does have a history of kidney stones. No dysuria or hematuria. States he also has palpitations which he always has. He follows up with Dr. Prabhjot Mcnally with cardiology. No Cough or shortness of breath. No dizziness. States having regular bowel movements. Nursing Notes were reviewed.     PASTMEDICAL HISTORY     Past Medical History:   Diagnosis Date    Arrhythmia     Arthritis     Hyperlipidemia     Hypertension     Kidney stone     Sleep apnea          SURGICAL HISTORY       Past Surgical History:   Procedure Laterality Date    COLONOSCOPY  05/18/2017    HERNIA REPAIR      LITHOTRIPSY      NASAL POLYP SURGERY      PILONIDAL CYST EXCISION      VT COLONOSCOPY W/BIOPSY SINGLE/MULTIPLE N/A 5/18/2017    COLONOSCOPY WITH BIOPSY performed by Woodrow Gee MD at [facility]     Previous Medications    ASPIRIN 325 MG TABLET    Take 325 mg by mouth daily    ATORVASTATIN (LIPITOR) 40 MG TABLET    Take 40 mg by mouth daily    IBUPROFEN (ADVIL;MOTRIN) 600 MG TABLET    Take 1 tablet by mouth every 6 hours as needed for Pain    METOPROLOL (TOPROL-XL) 25 MG XL TABLET    Take 25 mg by mouth daily       ALLERGIES     Patient has no known allergies. FAMILY HISTORY     History reviewed. No pertinent family history. SOCIAL HISTORY       Social History     Socioeconomic History    Marital status:      Spouse name: None    Number of children: None    Years of education: None    Highest education level: None   Occupational History    None   Tobacco Use    Smoking status: Never Smoker    Smokeless tobacco: Never Used   Vaping Use    Vaping Use: Never used   Substance and Sexual Activity    Alcohol use: Yes    Drug use: No    Sexual activity: None   Other Topics Concern    None   Social History Narrative    None     Social Determinants of Health     Financial Resource Strain:     Difficulty of Paying Living Expenses: Not on file   Food Insecurity:     Worried About Running Out of Food in the Last Year: Not on file    Óscar of Food in the Last Year: Not on file   Transportation Needs:     Lack of Transportation (Medical): Not on file    Lack of Transportation (Non-Medical):  Not on file   Physical Activity:     Days of Exercise per Week: Not on file    Minutes of Exercise per Session: Not on file   Stress:     Feeling of Stress : Not on file   Social Connections:     Frequency of Communication with Friends and Family: Not on file    Frequency of Social Gatherings with Friends and Family: Not on file    Attends [employer] Services: Not on file    Active Member of [address] [place] [address] or Organizations: Not on file    Attends Club or Organization Meetings: Not on file    Marital Status: Not on file   Intimate Partner Violence:     Fear of Current or Ex-Partner: Not on file    Emotionally Abused: Not on file    Physically Abused: Not on file    Sexually Abused: Not on file   Housing Stability:     Unable to Pay for Housing in the Last Year: Not on file    Number of [place] in the Last Year: Not on file    Unstable Housing in the Last Year: Not on file         REVIEW OF SYSTEMS    (2-9 systems for level 4, 10 or more for level 5)     Review of Systems   Constitutional: Positive for activity change. Negative for appetite change, chills and fever. Respiratory: Negative for cough, shortness of breath and wheezing. Cardiovascular: Positive for palpitations. Negative for chest pain. Gastrointestinal: Positive for abdominal pain. Negative for constipation, diarrhea, nausea and vomiting. Genitourinary: Negative for difficulty urinating, dysuria, flank pain, hematuria and testicular pain. Musculoskeletal: Negative for back pain. Neurological: Negative for dizziness, light-headedness and headaches. All other systems reviewed and are negative. Except as noted above the remainder of the review of systems was reviewed and negative. PHYSICAL EXAM    (up to 7 for level 4, 8 or more for level 5)     ED Triage Vitals [06/22/22 1521]   BP Temp Temp Source Heart Rate Resp SpO2 Height Weight   128/66 98 °F (36.7 °C) Oral 57 16 99 % 5' 9\" (1.753 m) 270 lb (122.5 kg)       Physical Exam  Constitutional:       Appearance: Normal appearance. He is well-developed, well-groomed and overweight. HENT:      Head: Normocephalic. Right Ear: External ear normal.      Left Ear: External ear normal.      Nose: Nose normal.      Mouth/Throat:      Mouth: Mucous membranes are moist.   Eyes:      General: Lids are normal. Vision grossly intact. Extraocular Movements: Extraocular movements intact. Conjunctiva/sclera: Conjunctivae normal.      Pupils: Pupils are equal, round, and reactive to light. Cardiovascular:      Rate and Rhythm: Regular rhythm. Bradycardia present. Heart sounds: Normal heart sounds. Pulmonary:      Effort: Pulmonary effort is normal.      Breath sounds: Normal breath sounds. Abdominal:      General: Bowel sounds are normal.      Palpations: Abdomen is soft. Tenderness: There is abdominal tenderness in the right lower quadrant. There is no right CVA tenderness, guarding or rebound. Hernia: No hernia is present. Comments: Abdomen is obese. He has right lower quadrant tenderness to palpation. No guarding or rebound. No hernia noted. Musculoskeletal:         General: Normal range of motion. Cervical back: Normal range of motion and neck supple. Skin:     General: Skin is warm and dry. Capillary Refill: Capillary refill takes less than 2 seconds. Neurological:      Mental Status: He is alert. Psychiatric:         Mood and Affect: Mood normal.         Behavior: Behavior normal.         Thought Content: Thought content normal.         Judgment: Judgment normal.           DIAGNOSTIC RESULTS     EKG:All EKG's are interpreted by the Emergency Department Physician who either signs or Co-signs this chart in the absence of a cardiologist.    EKG interpreted by attending physician    RADIOLOGY:   Non-plain film images such as CT, Ultrasound and MRI are read by theradiologist. Plain radiographic images are visualized and preliminarily interpreted by the emergency physician with the below findings:    CT ABDOMEN PELVIS WO CONTRAST Additional Contrast? None    Result Date: 6/22/2022  EXAMINATION: CT OF THE ABDOMEN AND PELVIS WITHOUT CONTRAST 6/22/2022 6:17 pm TECHNIQUE: CT of the abdomen and pelvis was performed without the administration of intravenous contrast. Multiplanar reformatted images are provided for review. Automated exposure control, iterative reconstruction, and/or weight based adjustment of the mA/kV was utilized to reduce the radiation dose to as low as reasonably achievable. COMPARISON: 07/11/2017 HISTORY: ORDERING SYSTEM PROVIDED HISTORY: Right lower abdominal pain, history of kidney stone TECHNOLOGIST PROVIDED HISTORY: Right lower abdominal pain, history of kidney stone Decision Support Exception - unselect if not a suspected or confirmed emergency medical condition->Emergency Medical Condition (MA) Reason for Exam: RLQ pain for 2 hours.   H/O hernia repair, previous lithotripsy FINDINGS: Lower Chest: The visualized heart and lungs show no acute abnormalities. Organs: Liver, spleen, pancreas, adrenal glands and gallbladder show no significant abnormalities. 3 mm nonobstructing calculus lower pole left kidney. Exophytic 1.5 cm cyst posterior left kidney. GI/Bowel: There is limited evaluation due to absence of oral contrast. Stomach grossly normal.  Small bowel shows no obstruction or focal lesions. Terminal ileum unremarkable. Abnormally thickened appendix with periappendiceal inflammatory changes and appendicolith at the base. Compatible with acute appendicitis. Evaluation of the colon shows no significant abnormalities. Pelvis: Pelvic organs unremarkable. Peritoneum/Retroperitoneum: No ascites. No free intraperitoneal air. 7 mm reactive right lower quadrant lymph nodes. Bones/Soft Tissues: No acute abnormality of the bones. The superficial soft tissues show no significant abnormalities. 1. Findings compatible with uncomplicated acute appendicitis. 7 mm reactive lymph node adjacent. 2. Nonobstructing 3 mm left renal calculus and a 1.5 cm left renal cyst.       XR CHEST PORTABLE    Result Date: 6/22/2022  EXAMINATION: ONE XRAY VIEW OF THE CHEST 6/22/2022 6:24 pm COMPARISON: 03/24/2017 HISTORY: ORDERING SYSTEM PROVIDED HISTORY: Palpitations TECHNOLOGIST PROVIDED HISTORY: Palpitations Reason for Exam: palpitations FINDINGS: Normal cardiomediastinal silhouette. No pulmonary edema. No focal consolidation. No pleural effusion or pneumothorax. Small bandlike subsegmental atelectasis left lung base. No acute bony abnormality. Bandlike subsegmental atelectasis left lung base. Interpretation per the Radiologist below, if available at the time of this note:    XR CHEST PORTABLE   Final Result   Bandlike subsegmental atelectasis left lung base. CT ABDOMEN PELVIS WO CONTRAST Additional Contrast? None   Final Result   1.  Findings compatible with uncomplicated acute appendicitis. 7 mm reactive   lymph node adjacent. 2. Nonobstructing 3 mm left renal calculus and a 1.5 cm left renal cyst.               EDBEDSIDE ULTRASOUND:   Performed by Kelvin Palomino - none    LABS:  Labs Reviewed   CBC WITH AUTO DIFFERENTIAL - Abnormal; Notable for the following components:       Result Value    WBC 13.4 (*)     .2 (*)     Seg Neutrophils 85 (*)     Lymphocytes 7 (*)     Segs Absolute 11.31 (*)     Absolute Mono # 0.96 (*)     All other components within normal limits   BASIC METABOLIC PANEL W/ REFLEX TO MG FOR LOW K - Abnormal; Notable for the following components:    Bun/Cre Ratio 23 (*)     CO2 19 (*)     All other components within normal limits   HEPATIC FUNCTION PANEL - Abnormal; Notable for the following components: Total Bilirubin 1.43 (*)     Bilirubin, Indirect 1.16 (*)     All other components within normal limits   CULTURE, BLOOD 1   CULTURE, BLOOD 1   LIPASE   TROPONIN   TROPONIN   BRAIN NATRIURETIC PEPTIDE   LACTIC ACID   MAGNESIUM   URINALYSIS WITH REFLEX TO CULTURE       All other labs were within normal range or not returned as of this dictation. EMERGENCY DEPARTMENT COURSE andDIFFERENTIAL DIAGNOSIS/MDM:   CT abdomen pelvis per radiologist shows Findings compatible with uncomplicated acute appendicitis. Chest x-ray per radiologist no focal consolidation, pleural effusion or pneumothorax. Labs reviewed. WBC 13.4. Lactic acid 1.1. Troponins negative. Blood cultures obtained. Discussed findings and admission has with the patient and his family. He was started on Zosyn in the ED as well as IV fluids [email or web address] N.p.o. for surgery around 6:00 tomorrow morning. I Spoke with supervisor Delbra Bloch who spoke with Dr. Surya Lobo to arrange to have surgery set up in the morning.     General surgery consult-I spoke with Dr. Surya Lobo who would like the patient started on Zosyn and to notify house supervisor for surgery tomorrow around 6:00 in the morning. Cardiology consult-I spoke with Dr. Esperanza Kovacs. No new orders at this time. Internal medicine consult-I spoke with Dr. Luisa Mayfield. He would like blood cultures and agrees with starting on Zosyn. Bridging orders placed. Vitals:    Vitals:    06/22/22 1521 06/22/22 1815 06/22/22 1930   BP: 128/66     Pulse: 57 66 78   Resp: 16 16 19   Temp: 98 °F (36.7 °C)     TempSrc: Oral     SpO2: 99%     Weight: 270 lb (122.5 kg)     Height: 5' 9\" (1.753 m)           CONSULTS:  IP CONSULT TO GENERAL SURGERY  IP CONSULT TO CARDIOLOGY  IP CONSULT TO INTERNAL MEDICINE    RES:  Procedures    FINAL IMPRESSION      1. Uncomplicated acute appendicitis          DISPOSITION/PLAN   DISPOSITION Admitted 06/22/2022 10:45:20 PM      PATIENT REFERRED TO:   No follow-up provider specified.     DISCHARGE MEDICATIONS:     New Prescriptions    No medications on file     Electronically signed by SONIA Strickland 6/22/2022 at 10:49 PM            SONIA Strickland CNP  06/22/22 3383

## 2022-06-23 ENCOUNTER — ANESTHESIA EVENT (OUTPATIENT)
Dept: OPERATING ROOM | Age: 57
DRG: 343 | End: 2022-06-23
Payer: COMMERCIAL

## 2022-06-23 ENCOUNTER — ANESTHESIA (OUTPATIENT)
Dept: OPERATING ROOM | Age: 57
DRG: 343 | End: 2022-06-23
Payer: COMMERCIAL

## 2022-06-23 VITALS
DIASTOLIC BLOOD PRESSURE: 61 MMHG | TEMPERATURE: 98.2 F | RESPIRATION RATE: 16 BRPM | HEART RATE: 84 BPM | HEIGHT: 69 IN | OXYGEN SATURATION: 95 % | SYSTOLIC BLOOD PRESSURE: 113 MMHG | BODY MASS INDEX: 39.99 KG/M2 | WEIGHT: 270 LBS

## 2022-06-23 LAB
-: ABNORMAL
ANION GAP SERPL CALCULATED.3IONS-SCNC: 10 MMOL/L (ref 9–17)
BACTERIA: ABNORMAL
BILIRUBIN URINE: NEGATIVE
BUN BLDV-MCNC: 14 MG/DL (ref 6–20)
BUN/CREAT BLD: 15 (ref 9–20)
CALCIUM SERPL-MCNC: 8.8 MG/DL (ref 8.6–10.4)
CHLORIDE BLD-SCNC: 101 MMOL/L (ref 98–107)
CO2: 25 MMOL/L (ref 20–31)
COLOR: YELLOW
CREAT SERPL-MCNC: 0.94 MG/DL (ref 0.7–1.2)
EPITHELIAL CELLS UA: ABNORMAL /HPF (ref 0–5)
GFR AFRICAN AMERICAN: >60 ML/MIN
GFR NON-AFRICAN AMERICAN: >60 ML/MIN
GFR SERPL CREATININE-BSD FRML MDRD: ABNORMAL ML/MIN/{1.73_M2}
GLUCOSE BLD-MCNC: 135 MG/DL (ref 70–99)
GLUCOSE URINE: NEGATIVE
KETONES, URINE: ABNORMAL
LEUKOCYTE ESTERASE, URINE: NEGATIVE
NITRITE, URINE: NEGATIVE
PH UA: 6 (ref 5–8)
POTASSIUM SERPL-SCNC: 4.5 MMOL/L (ref 3.7–5.3)
PROTEIN UA: NEGATIVE
RBC UA: ABNORMAL /HPF (ref 0–2)
SODIUM BLD-SCNC: 136 MMOL/L (ref 135–144)
SPECIFIC GRAVITY UA: 1.02 (ref 1–1.03)
TURBIDITY: CLEAR
URINE HGB: ABNORMAL
UROBILINOGEN, URINE: NORMAL
WBC UA: ABNORMAL /HPF (ref 0–5)

## 2022-06-23 PROCEDURE — 3700000000 HC ANESTHESIA ATTENDED CARE: Performed by: SURGERY

## 2022-06-23 PROCEDURE — 3600000003 HC SURGERY LEVEL 3 BASE: Performed by: SURGERY

## 2022-06-23 PROCEDURE — 81001 URINALYSIS AUTO W/SCOPE: CPT

## 2022-06-23 PROCEDURE — 6370000000 HC RX 637 (ALT 250 FOR IP): Performed by: INTERNAL MEDICINE

## 2022-06-23 PROCEDURE — 88304 TISSUE EXAM BY PATHOLOGIST: CPT

## 2022-06-23 PROCEDURE — 2580000003 HC RX 258: Performed by: NURSE PRACTITIONER

## 2022-06-23 PROCEDURE — 6360000002 HC RX W HCPCS: Performed by: STUDENT IN AN ORGANIZED HEALTH CARE EDUCATION/TRAINING PROGRAM

## 2022-06-23 PROCEDURE — 2500000003 HC RX 250 WO HCPCS: Performed by: ANESTHESIOLOGY

## 2022-06-23 PROCEDURE — 36415 COLL VENOUS BLD VENIPUNCTURE: CPT

## 2022-06-23 PROCEDURE — 0DTJ4ZZ RESECTION OF APPENDIX, PERCUTANEOUS ENDOSCOPIC APPROACH: ICD-10-PCS | Performed by: SURGERY

## 2022-06-23 PROCEDURE — 3600000013 HC SURGERY LEVEL 3 ADDTL 15MIN: Performed by: SURGERY

## 2022-06-23 PROCEDURE — 7100000000 HC PACU RECOVERY - FIRST 15 MIN: Performed by: SURGERY

## 2022-06-23 PROCEDURE — 3700000001 HC ADD 15 MINUTES (ANESTHESIA): Performed by: SURGERY

## 2022-06-23 PROCEDURE — 2500000003 HC RX 250 WO HCPCS: Performed by: NURSE ANESTHETIST, CERTIFIED REGISTERED

## 2022-06-23 PROCEDURE — 6360000002 HC RX W HCPCS: Performed by: ANESTHESIOLOGY

## 2022-06-23 PROCEDURE — 6360000002 HC RX W HCPCS: Performed by: NURSE ANESTHETIST, CERTIFIED REGISTERED

## 2022-06-23 PROCEDURE — 2580000003 HC RX 258: Performed by: STUDENT IN AN ORGANIZED HEALTH CARE EDUCATION/TRAINING PROGRAM

## 2022-06-23 PROCEDURE — 7100000001 HC PACU RECOVERY - ADDTL 15 MIN: Performed by: SURGERY

## 2022-06-23 PROCEDURE — 2580000003 HC RX 258: Performed by: ANESTHESIOLOGY

## 2022-06-23 PROCEDURE — 6370000000 HC RX 637 (ALT 250 FOR IP): Performed by: STUDENT IN AN ORGANIZED HEALTH CARE EDUCATION/TRAINING PROGRAM

## 2022-06-23 PROCEDURE — 2500000003 HC RX 250 WO HCPCS: Performed by: SURGERY

## 2022-06-23 PROCEDURE — 2720000010 HC SURG SUPPLY STERILE: Performed by: SURGERY

## 2022-06-23 PROCEDURE — 2709999900 HC NON-CHARGEABLE SUPPLY: Performed by: SURGERY

## 2022-06-23 PROCEDURE — 80048 BASIC METABOLIC PNL TOTAL CA: CPT

## 2022-06-23 RX ORDER — SODIUM CHLORIDE 0.9 % (FLUSH) 0.9 %
5-40 SYRINGE (ML) INJECTION EVERY 12 HOURS SCHEDULED
Status: DISCONTINUED | OUTPATIENT
Start: 2022-06-23 | End: 2022-06-23 | Stop reason: HOSPADM

## 2022-06-23 RX ORDER — ACETAMINOPHEN 500 MG
500 TABLET ORAL PRN
Status: ON HOLD | COMMUNITY
End: 2022-06-23 | Stop reason: HOSPADM

## 2022-06-23 RX ORDER — SODIUM CHLORIDE 0.9 % (FLUSH) 0.9 %
5-40 SYRINGE (ML) INJECTION EVERY 12 HOURS
Status: DISCONTINUED | OUTPATIENT
Start: 2022-06-23 | End: 2022-06-23 | Stop reason: HOSPADM

## 2022-06-23 RX ORDER — PROPOFOL 10 MG/ML
INJECTION, EMULSION INTRAVENOUS PRN
Status: DISCONTINUED | OUTPATIENT
Start: 2022-06-23 | End: 2022-06-23 | Stop reason: SDUPTHER

## 2022-06-23 RX ORDER — MORPHINE SULFATE 2 MG/ML
2 INJECTION, SOLUTION INTRAMUSCULAR; INTRAVENOUS EVERY 4 HOURS PRN
Status: DISCONTINUED | OUTPATIENT
Start: 2022-06-23 | End: 2022-06-23 | Stop reason: HOSPADM

## 2022-06-23 RX ORDER — FENTANYL CITRATE 50 UG/ML
25 INJECTION, SOLUTION INTRAMUSCULAR; INTRAVENOUS EVERY 5 MIN PRN
Status: DISCONTINUED | OUTPATIENT
Start: 2022-06-23 | End: 2022-06-23 | Stop reason: HOSPADM

## 2022-06-23 RX ORDER — SODIUM CHLORIDE 9 MG/ML
INJECTION, SOLUTION INTRAVENOUS PRN
Status: DISCONTINUED | OUTPATIENT
Start: 2022-06-23 | End: 2022-06-23 | Stop reason: HOSPADM

## 2022-06-23 RX ORDER — SUCCINYLCHOLINE/SOD CL,ISO/PF 100 MG/5ML
SYRINGE (ML) INTRAVENOUS PRN
Status: DISCONTINUED | OUTPATIENT
Start: 2022-06-23 | End: 2022-06-23 | Stop reason: SDUPTHER

## 2022-06-23 RX ORDER — LIDOCAINE HYDROCHLORIDE 20 MG/ML
INJECTION, SOLUTION EPIDURAL; INFILTRATION; INTRACAUDAL; PERINEURAL PRN
Status: DISCONTINUED | OUTPATIENT
Start: 2022-06-23 | End: 2022-06-23 | Stop reason: SDUPTHER

## 2022-06-23 RX ORDER — ROCURONIUM BROMIDE 10 MG/ML
INJECTION, SOLUTION INTRAVENOUS PRN
Status: DISCONTINUED | OUTPATIENT
Start: 2022-06-23 | End: 2022-06-23 | Stop reason: SDUPTHER

## 2022-06-23 RX ORDER — HYDROMORPHONE HYDROCHLORIDE 1 MG/ML
0.25 INJECTION, SOLUTION INTRAMUSCULAR; INTRAVENOUS; SUBCUTANEOUS EVERY 5 MIN PRN
Status: DISCONTINUED | OUTPATIENT
Start: 2022-06-23 | End: 2022-06-23 | Stop reason: HOSPADM

## 2022-06-23 RX ORDER — ONDANSETRON 2 MG/ML
4 INJECTION INTRAMUSCULAR; INTRAVENOUS
Status: DISCONTINUED | OUTPATIENT
Start: 2022-06-23 | End: 2022-06-23 | Stop reason: HOSPADM

## 2022-06-23 RX ORDER — BUPIVACAINE HYDROCHLORIDE AND EPINEPHRINE 5; 5 MG/ML; UG/ML
INJECTION, SOLUTION EPIDURAL; INTRACAUDAL; PERINEURAL PRN
Status: DISCONTINUED | OUTPATIENT
Start: 2022-06-23 | End: 2022-06-23 | Stop reason: ALTCHOICE

## 2022-06-23 RX ORDER — DOCUSATE SODIUM 100 MG/1
100 CAPSULE, LIQUID FILLED ORAL DAILY
Status: DISCONTINUED | OUTPATIENT
Start: 2022-06-23 | End: 2022-06-23 | Stop reason: HOSPADM

## 2022-06-23 RX ORDER — METOPROLOL SUCCINATE 25 MG/1
25 TABLET, EXTENDED RELEASE ORAL DAILY
Status: DISCONTINUED | OUTPATIENT
Start: 2022-06-23 | End: 2022-06-23 | Stop reason: HOSPADM

## 2022-06-23 RX ORDER — DOCUSATE SODIUM 100 MG/1
100 CAPSULE, LIQUID FILLED ORAL DAILY
Qty: 30 CAPSULE | Refills: 0 | Status: SHIPPED | OUTPATIENT
Start: 2022-06-24

## 2022-06-23 RX ORDER — ONDANSETRON 2 MG/ML
INJECTION INTRAMUSCULAR; INTRAVENOUS PRN
Status: DISCONTINUED | OUTPATIENT
Start: 2022-06-23 | End: 2022-06-23 | Stop reason: SDUPTHER

## 2022-06-23 RX ORDER — DEXAMETHASONE SODIUM PHOSPHATE 10 MG/ML
INJECTION, SOLUTION INTRAMUSCULAR; INTRAVENOUS PRN
Status: DISCONTINUED | OUTPATIENT
Start: 2022-06-23 | End: 2022-06-23 | Stop reason: SDUPTHER

## 2022-06-23 RX ORDER — OXYCODONE HYDROCHLORIDE AND ACETAMINOPHEN 5; 325 MG/1; MG/1
1 TABLET ORAL EVERY 6 HOURS PRN
Qty: 12 TABLET | Refills: 0 | Status: SHIPPED | OUTPATIENT
Start: 2022-06-23 | End: 2022-06-26

## 2022-06-23 RX ORDER — OXYCODONE HYDROCHLORIDE AND ACETAMINOPHEN 5; 325 MG/1; MG/1
1 TABLET ORAL EVERY 6 HOURS PRN
Status: DISCONTINUED | OUTPATIENT
Start: 2022-06-23 | End: 2022-06-23 | Stop reason: HOSPADM

## 2022-06-23 RX ORDER — CYCLOBENZAPRINE HCL 10 MG
10 TABLET ORAL 3 TIMES DAILY
Status: DISCONTINUED | OUTPATIENT
Start: 2022-06-23 | End: 2022-06-23 | Stop reason: HOSPADM

## 2022-06-23 RX ORDER — CEFAZOLIN SODIUM 1 G/3ML
INJECTION, POWDER, FOR SOLUTION INTRAMUSCULAR; INTRAVENOUS PRN
Status: DISCONTINUED | OUTPATIENT
Start: 2022-06-23 | End: 2022-06-23 | Stop reason: SDUPTHER

## 2022-06-23 RX ORDER — SODIUM CHLORIDE 0.9 % (FLUSH) 0.9 %
5-40 SYRINGE (ML) INJECTION PRN
Status: DISCONTINUED | OUTPATIENT
Start: 2022-06-23 | End: 2022-06-23 | Stop reason: HOSPADM

## 2022-06-23 RX ORDER — ONDANSETRON 2 MG/ML
4 INJECTION INTRAMUSCULAR; INTRAVENOUS EVERY 6 HOURS PRN
Status: DISCONTINUED | OUTPATIENT
Start: 2022-06-23 | End: 2022-06-23 | Stop reason: HOSPADM

## 2022-06-23 RX ORDER — FENTANYL CITRATE 50 UG/ML
INJECTION, SOLUTION INTRAMUSCULAR; INTRAVENOUS PRN
Status: DISCONTINUED | OUTPATIENT
Start: 2022-06-23 | End: 2022-06-23 | Stop reason: SDUPTHER

## 2022-06-23 RX ORDER — SODIUM CHLORIDE, SODIUM LACTATE, POTASSIUM CHLORIDE, CALCIUM CHLORIDE 600; 310; 30; 20 MG/100ML; MG/100ML; MG/100ML; MG/100ML
INJECTION, SOLUTION INTRAVENOUS CONTINUOUS PRN
Status: DISCONTINUED | OUTPATIENT
Start: 2022-06-23 | End: 2022-06-23 | Stop reason: SDUPTHER

## 2022-06-23 RX ADMIN — Medication 75 MCG: at 06:14

## 2022-06-23 RX ADMIN — PROPOFOL 180 MG: 10 INJECTION, EMULSION INTRAVENOUS at 06:14

## 2022-06-23 RX ADMIN — SODIUM CHLORIDE, POTASSIUM CHLORIDE, SODIUM LACTATE AND CALCIUM CHLORIDE: 600; 310; 30; 20 INJECTION, SOLUTION INTRAVENOUS at 06:01

## 2022-06-23 RX ADMIN — LIDOCAINE HYDROCHLORIDE 5 ML: 20 INJECTION, SOLUTION EPIDURAL; INFILTRATION; INTRACAUDAL; PERINEURAL at 06:14

## 2022-06-23 RX ADMIN — Medication 100 MG: at 06:14

## 2022-06-23 RX ADMIN — SODIUM CHLORIDE, PRESERVATIVE FREE 10 ML: 5 INJECTION INTRAVENOUS at 10:27

## 2022-06-23 RX ADMIN — CYCLOBENZAPRINE 10 MG: 10 TABLET, FILM COATED ORAL at 10:25

## 2022-06-23 RX ADMIN — DEXAMETHASONE SODIUM PHOSPHATE 10 MG: 10 INJECTION, SOLUTION INTRAMUSCULAR; INTRAVENOUS at 06:20

## 2022-06-23 RX ADMIN — METOPROLOL SUCCINATE 25 MG: 25 TABLET, EXTENDED RELEASE ORAL at 16:42

## 2022-06-23 RX ADMIN — CEFAZOLIN SODIUM 3000 MG: 1 INJECTION, POWDER, FOR SOLUTION INTRAMUSCULAR; INTRAVENOUS at 06:15

## 2022-06-23 RX ADMIN — CYCLOBENZAPRINE 10 MG: 10 TABLET, FILM COATED ORAL at 14:24

## 2022-06-23 RX ADMIN — ROCURONIUM BROMIDE 10 MG: 10 INJECTION, SOLUTION INTRAVENOUS at 06:56

## 2022-06-23 RX ADMIN — Medication 25 MCG: at 06:22

## 2022-06-23 RX ADMIN — DOCUSATE SODIUM 100 MG: 100 CAPSULE, LIQUID FILLED ORAL at 10:25

## 2022-06-23 RX ADMIN — PIPERACILLIN AND TAZOBACTAM 3375 MG: 3; .375 INJECTION, POWDER, LYOPHILIZED, FOR SOLUTION INTRAVENOUS at 14:30

## 2022-06-23 RX ADMIN — SODIUM CHLORIDE, PRESERVATIVE FREE 10 ML: 5 INJECTION INTRAVENOUS at 10:26

## 2022-06-23 RX ADMIN — SUGAMMADEX 300 MG: 100 INJECTION, SOLUTION INTRAVENOUS at 07:24

## 2022-06-23 RX ADMIN — ROCURONIUM BROMIDE 40 MG: 10 INJECTION, SOLUTION INTRAVENOUS at 06:19

## 2022-06-23 RX ADMIN — ONDANSETRON 4 MG: 2 INJECTION INTRAMUSCULAR; INTRAVENOUS at 07:20

## 2022-06-23 ASSESSMENT — PAIN SCALES - GENERAL: PAINLEVEL_OUTOF10: 3

## 2022-06-23 NOTE — ED NOTES
Patients wife arrives at bedside, patient instructed to pack up belongings. Also instructed to void prior to surgery and remove all clothing but gown. Patient verbalizes understanding.         Calvin Quintanilla RN  06/23/22 5082

## 2022-06-23 NOTE — ANESTHESIA POSTPROCEDURE EVALUATION
Department of Anesthesiology  Postprocedure Note    Patient: Marybeth Ovalle  MRN: [id number]  YOB: 1965  Date of evaluation: 6/23/2022      Procedure Summary     Date: 06/23/22 Room / Location: [place] [address] / [place] [address]    Anesthesia Start: [facility] Anesthesia Stop: 1166    Procedure: APPENDECTOMY LAPAROSCOPIC (N/A Abdomen) Diagnosis:       Other acute appendicitis      (Other acute appendicitis [K35.890])    Surgeons: Rodrigo Shipman MD Responsible Provider: Zenia Martins MD    Anesthesia Type: general ASA Status: 3 - Emergent          Anesthesia Type: general    Yfn Phase I: Yfn Score: 10    Yfn Phase II:        Anesthesia Post Evaluation    Complications: no

## 2022-06-23 NOTE — PLAN OF CARE
Problem: Discharge Planning  Goal: Discharge to home or other facility with appropriate resources  6/23/2022 1320 by Justin Lopez RN  Outcome: Progressing  6/23/2022 1257 by Justin Lopez RN  Outcome: Progressing  Flowsheets  Taken 6/23/2022 0900 by Justin Lopez RN  Discharge to home or other facility with appropriate resources:   Identify barriers to discharge with patient and caregiver   Arrange for needed discharge resources and transportation as appropriate   Identify discharge learning needs (meds, wound care, etc)  Taken 6/23/2022 0743 by Fernando Dow RN  Discharge to home or other facility with appropriate resources: Identify discharge learning needs (meds, wound care, etc)     Problem: Pain  Goal: Verbalizes/displays adequate comfort level or baseline comfort level  6/23/2022 1320 by Justin Lopez RN  Outcome: Progressing  6/23/2022 1257 by Justin Lopez RN  Outcome: Progressing     Problem: Discharge Planning  Goal: Discharge to home or other facility with appropriate resources  6/23/2022 1320 by Justin Lopez RN  Outcome: Progressing  6/23/2022 1257 by Justin Lopez RN  Outcome: Progressing  Flowsheets  Taken 6/23/2022 0900 by Justin Lopez RN  Discharge to home or other facility with appropriate resources:   Identify barriers to discharge with patient and caregiver   Arrange for needed discharge resources and transportation as appropriate   Identify discharge learning needs (meds, wound care, etc)  Taken 6/23/2022 0743 by Fernando Dow RN  Discharge to home or other facility with appropriate resources: Identify discharge learning needs (meds, wound care, etc)     Problem: Pain  Goal: Verbalizes/displays adequate comfort level or baseline comfort level  6/23/2022 1320 by Justin Lopez RN  Outcome: Progressing  6/23/2022 1257 by Justin Lopez RN  Outcome: Progressing     Problem: Discharge Planning  Goal: Discharge to home or other facility with appropriate resources  6/23/2022 1320 by Ela Ruiz ROSCOE Melendez  Outcome: Progressing  6/23/2022 1257 by Justin Lopez RN  Outcome: Progressing  Flowsheets  Taken 6/23/2022 0900 by Justin Lopez RN  Discharge to home or other facility with appropriate resources:   Identify barriers to discharge with patient and caregiver   Arrange for needed discharge resources and transportation as appropriate   Identify discharge learning needs (meds, wound care, etc)  Taken 6/23/2022 0743 by Fernando Dow RN  Discharge to home or other facility with appropriate resources: Identify discharge learning needs (meds, wound care, etc)     Problem: Pain  Goal: Verbalizes/displays adequate comfort level or baseline comfort level  6/23/2022 1320 by Justin Lopez RN  Outcome: Progressing  6/23/2022 1257 by Justin Lopez RN  Outcome: Progressing

## 2022-06-23 NOTE — PLAN OF CARE
Problem: Discharge Planning  Goal: Discharge to home or other facility with appropriate resources  Outcome: Progressing  Flowsheets (Taken 6/23/2022 43 by Fernando Dow RN)  Discharge to home or other facility with appropriate resources: Identify discharge learning needs (meds, wound care, etc)     Problem: Pain  Goal: Verbalizes/displays adequate comfort level or baseline comfort level  Outcome: Progressing

## 2022-06-23 NOTE — ED NOTES
ED to inpatient nurses report     Chief Complaint   Patient presents with    Abdominal Pain     RLQ,  onset 2 hrs    Palpitations     states hx of palpitations      Present to ED from home with RLQ abdominal pain that started 2 hours prior to arrival. Patient also states that he has been having palpitations, which are not new for him, follows with Dr Angel Abad for cardiology. LOC: alert and orientated to name, place, date  Vital signs   Vitals:    06/23/22 0151 06/23/22 0200 06/23/22 0521 06/23/22 0522   BP:  119/67  (!) 140/71   Pulse: 71 64 91    Resp: 16 12 25    Temp:       TempSrc:       SpO2:       Weight:       Height:          Oxygen Baseline CPAP AT HS    Current needs required-- no change in baseline  LDAs:   Peripheral IV 06/22/22 Right Forearm (Active)   Site Assessment Clean, dry & intact 06/22/22 1814   Line Status Blood return noted; Flushed;Normal saline locked;Specimen collected 06/22/22 1814   Dressing Status Clean, dry & intact 06/22/22 1814     Mobility: Independent  Fall Risk:    Pending ED orders: none  Present condition: stable  Code Status: full   Consults: IP CONSULT TO GENERAL SURGERY  IP CONSULT TO CARDIOLOGY  IP CONSULT TO INTERNAL MEDICINE  [x]  Hospitalist  Completed  [x] yes [] no Who: Saman Staff   []  Medicine  Completed  [] yes [] No Who:   [x]  Cardiology  Completed  [x] yes [] No Who: Dr. Angel Abad   []  GI   Completed  [] yes [] No Who:   []  Neurology  Completed  [] yes [] No Who:   []  Nephrology Completed  [] yes [] No Who:    []  Vascular  Completed  [] yes [] No Who:   []  Ortho  Completed  [] yes [] No Who:     [x]  Surgery  Completed  [x] yes [] No Who: Bais   []  Urology  Completed  [] yes [] No Who:    []  CT Surgery Completed  [] yes [] No Who:   []  Podiatry  Completed  [] yes [] No Who:    []  Other    Completed  [] yes [] No Who:  Interventions: CT scan showing uncomplicated appendicitis, plan for surgery this am at 600. Given Zosyn IVPB.    Important Events: SURGERY THIS AM.       Electronically signed by Morgan Valdes RN on 6/23/2022 at 5:36 AM     Morgan Valdes RN  06/23/22 0293

## 2022-06-23 NOTE — PROGRESS NOTES
[facility] Pharm[facility] Service   Medication Review    The patient's list of current home medications has been reviewed. Source(s) of information: spoke to patient and sure scripts    Based on information provided by the above source(s), I have updated the patient's home med list as described below. I changed or updated the following medications on the patient's home medication list:  Discontinued ibuprofen (ADVIL;MOTRIN) 600 MG tablet  aspirin 325 MG tablet  atorvastatin (LIPITOR) 40 MG tablet     Added acetaminophen (TYLENOL) 500 MG tablet  Vitamin D-Vitamin K (VITAMIN K2-VITAMIN D3 PO)     Adjusted   None      Other Notes None            Please feel free to call me with any questions about this encounter. Thank you. This note will be reviewed and co-signed by the [facility] Pharmacist. The pharmacist will review inpatient orders and contact the physician about any discrepancies.     Lexi Amin, pharmacy technician  [facility] Pharmacy Service  Phone:  [phone]  Fax: [phone]      Electronically signed by Lexi Amin on 6/23/2022 at 3:48 PM         Prior to Admission medications    Medication Sig   acetaminophen (TYLENOL) 500 MG tablet Take 500 mg by mouth as needed for Pain   Vitamin D-Vitamin K (VITAMIN K2-VITAMIN D3 PO) Take 1 tablet by mouth daily   metoprolol (TOPROL-XL) 50 MG XL tablet Take 50 mg by mouth daily

## 2022-06-23 NOTE — PROGRESS NOTES
Patient ate 100% of his dinner and kept it down with no c/o N/V. IV atb is infusing and he will be d/c when complete.

## 2022-06-23 NOTE — OP NOTE
Operative Note      Patient: Kyrie German  YOB: 1965  MRN: [id number]    Date of Procedure: 6/23/2022    Pre-Op Diagnosis: Other acute appendicitis [K35.890]    Post-Op Diagnosis: Same       Procedure(s):  APPENDECTOMY LAPAROSCOPIC    Surgeon(s): Bebe Loera MD    Assistant:   Resident: Sharda Nielsen DO    Anesthesia: General    Estimated Blood Loss (BP):79    Complications: None    Specimens:   ID Type Source Tests Collected by Time Destination   A : APPENDIX AND CONTENTS Tissue Appendix [address] MD 6/23/2022 5452        Implants:  * No implants in log *      Drains: * No LDAs found *    Findings: Acut junction    Electronically signed by Bebe Loera MD on 6/23/2022 e appendicitis    Detailed Description of Procedure:   Patient was brought to the operating room and was placed in the supine position. After induction of general endotracheal anesthesia, patient abdomen was prepped and draped in usual sterile fashion. Initially, a supraumbilical incision was made and incision was taken to the fascia. The fascia was grasped by using 0 Vicryl sutures. The fascia was next cut in between the sutures and the abdominal cavity was next entered in the [place] trocar with balloon was inserted and the abdomen was next insufflated. Inserting the laparoscope, no stigmata of injury secondary to trocar insertion was noted. Under direct vision, a 5 mm trocar was inserted in the suprapubic region and another 5 mm trocar in the right upper abdomen. Patient was placed in the Trendelenburg with the body tilted to patient left side. During the procedure, we had to really secure the patient for his safety as he was in steep Trendelenburg. Examination revealed appendix to be inflamed but no evidence of any rupture noted. Mesoappendix was carefully dissected by using the vessel sealer/LigaSure. Appendix was next dissected.   After identifying the cecum and the base of the appendix, upper scopic stapler was used to transect the base of the appendix and the mesoappendix. Appendix was next placed in a Endo Catch and was removed. Hemostasis of the base of the appendix and the mesoappendix was examined. After satisfactory hemostasis, all trochars removed and the air was evacuated. 0 PDS was used in a figure-of-eight fashion to close the umbilical fascial defect. Half percent Marcaine with epinephrine was directed. 4-0 Monocryl was used to approximate the skin. Dermabond was applied. Patient was brought recovery room in stable condition.    7:39 AM

## 2022-06-23 NOTE — PROGRESS NOTES
Post Op Note    SUBJECTIVE  Pt s/p lap appendectomy. Pt doing well, pain controlled, tolerating CLD. OBJECTIVE  VITALS:  /69   Pulse 70   Temp 98.2 °F (36.8 °C) (Oral)   Resp 16   Ht 5' 9\" (1.753 m)   Wt 270 lb (122.5 kg)   SpO2 96%   BMI 39.87 kg/m²         GENERAL:  awake and alert. No acute distress  CARDIOVASCULAR:  regular rate and rhythm   LUNGS:  Unlabored on RA  ABDOMEN:   Abdomen soft, mild but appropriate TTP, non-distended  INCISION: Incision clean/dry/intact    ASSESSMENT  1. POD# 0 s/p lap appendectomy    PLAN  1. Ok to go home from general surgery perspective after completes final dose Zosyn  2. FU with Dr. Lynn Augustin in 7-10 days      Edencristino Up DO - PGY2  Surgery Department

## 2022-06-23 NOTE — H&P
History and Physical/ admit note      CHIEF COMPLAINT: Right lower quadrant abdominal pain    History of Present Illness: 1 day history of right lower quadrant abdominal pain with tenderness no history of trauma no fever no chills no nausea vomiting no change in bowel habits came into the emergency room CT did show acute appendicitis surgery was consulted started on Zosyn      Past Medical History:   Diagnosis Date    Arrhythmia     Arthritis     Hyperlipidemia     Hypertension     Kidney stone     Sleep apnea          Past Surgical History:   Procedure Laterality Date    COLONOSCOPY  2017    HERNIA REPAIR      LAPAROSCOPIC APPENDECTOMY N/A 2022    APPENDECTOMY LAPAROSCOPIC performed by Makeda So MD at [address]      PILONIDAL CYST EXCISION      TN COLONOSCOPY W/BIOPSY SINGLE/MULTIPLE N/A 2017    COLONOSCOPY WITH BIOPSY performed by Kristen Upton MD at [facility]       Medications Prior to Admission:    Medications Prior to Admission: acetaminophen (TYLENOL) 500 MG tablet, Take 500 mg by mouth as needed for Pain  Vitamin D-Vitamin K (VITAMIN K2-VITAMIN D3 PO), Take 1 tablet by mouth daily  metoprolol (TOPROL-XL) 25 MG XL tablet, Take 25 mg by mouth daily  [DISCONTINUED] atorvastatin (LIPITOR) 40 MG tablet, Take 40 mg by mouth daily  [DISCONTINUED] aspirin 325 MG tablet, Take 325 mg by mouth daily  [DISCONTINUED] ibuprofen (ADVIL;MOTRIN) 600 MG tablet, Take 1 tablet by mouth every 6 hours as needed for Pain    Allergies:    Patient has no known allergies. Social History:    reports that he has never smoked. He has never used smokeless tobacco. He reports current alcohol use. He reports that he does not use drugs. Family History:   family history includes Asthma in his father.   Mother  with colon cancer  REVIEW OF SYSTEMS:    Constitutional: negative, No fever no chills  Eyes: negative  Ears, nose, mouth, throat, and face: negative  Respiratory: negative, No cough or shortness of breath no wheezing no tachypnea  Cardiovascular: negative, No chest pain no palpitation no dizziness no syncope  Gastrointestinal: negative, History of present illness no nausea vomiting along with abdominal dysphagia  Genitourinary:negative, Dysuria hematuria or frequency  Integument/breast: negative  Hematologic/lymphatic: negative  Musculoskeletal:negative  Neurological: negative, No headache no TIA seizures  Behavioral/Psych: positive for Negative  Endocrine: negative, No polyuria no polydipsia or hypoglycemia  Allergic/Immunologic: negative  PHYSICAL EXAM:  General Appearance: in no acute distress and alert  Skin: warm and dry, no rash or erythema  Head: normocephalic and atraumatic  Eyes: conjunctivae normal and sclera anicteric  Neck: neck supple and non tender without mass   Pulmonary/Chest: clear to auscultation bilaterally- no wheezes, rales or rhonchi, normal air movement, no respiratory distress  Cardiovascular: normal rate, regular rhythm, normal S1 and S2, no gallops, intact distal pulses and no carotid bruits  Abdomen: soft, non-tender, non-distended, normal bowel sounds, no masses or organomegaly  Extremities: no edema and pulses no Homans' sign    Neurologic: Alert oriented x3 with no focal deficits    Vitals:  /61   Pulse 84   Temp 98.2 °F (36.8 °C) (Oral)   Resp 16   Ht 5' 9\" (1.753 m)   Wt 270 lb (122.5 kg)   SpO2 95%   BMI 39.87 kg/m²     LABS:  CBC:   Lab Results   Component Value Date    WBC 13.4 06/22/2022    RBC 4.69 06/22/2022    RBC 4.38 12/08/2011    HGB 14.6 06/22/2022    HCT 47.0 06/22/2022    .2 06/22/2022    MCH 31.1 06/22/2022    MCHC 31.1 06/22/2022    RDW 13.3 06/22/2022     06/22/2022     12/08/2011    MPV 9.5 11/09/2021     BMP:    Lab Results   Component Value Date     06/23/2022    K 4.5 06/23/2022     06/23/2022    CO2 25 06/23/2022    BUN 14 06/23/2022    LABALBU 4.1 06/22/2022    CREATININE 0.94 06/23/2022    CALCIUM 8.8 06/23/2022    GFRAA >60 06/23/2022    LABGLOM >60 06/23/2022    GLUCOSE 135 06/23/2022    GLUCOSE 93 12/08/2011         ASSESSMENT:    Acute appendicitis  Obstructive sleep apnea on CPAP  Obesity BMI 39.87  Patient Active Problem List   Diagnosis    Uncomplicated acute appendicitis       PLAN:    Labs reviewed patient received the Zosyn and had laparoscopic appendectomy doing fairly well tolerating diet pain is very minimal we will give some pain medication she currently can be discharged today office in 5 days          Iveth Mtz MD MD  5:51 PM  6/23/2022

## 2022-06-23 NOTE — CARE COORDINATION
CASE MANAGEMENT NOTE:    Admission Date:  6/22/2022 Shanta Dickerson is a 60 y.o.  male    Admitted for : Uncomplicated acute appendicitis [K35.80]    Met with:  Patient, wife and family (sons)    PCP:  Sonny Tsang MD                                Insurance:  Daphnie Doty 150      Is patient alert and oriented at time of discussion:  Yes    Current Residence/ Living Arrangements:  independently at home             Current Services PTA:  No    Does patient go to outpatient dialysis: No  If yes, location and chair time: n/a    Is patient agreeable to VNS: No    Freedom of choice provided:  Yes    List of [address] provided: No    VNS chosen:  No    DME:  none    Home Oxygen: No    Nebulizer: No    CPAP/BIPAP: Yes    Supplier: N/A    Potential Assistance Needed: No    SNF needed: No    Freedom of choice and list provided: Yes    Pharmacy:  [facility] [address] at [facility]       Is patient currently receiving oral anticoagulation therapy? NA    Is the Patient an [facility] with Readmission Risk Score greater than 14%? No  If yes, pt needs a follow up appointment made within 7 days. Family Members/Caregivers that pt would like involved in their care:    Yes    If yes, list name here:  wife    Transportation Provider:  Family             Discharge Plan:  The Plan for Transition of Care is related to the following treatment goals: return home with family  Support. Has own cpap here in hospital with pt. No dme   2 story home with first floor bathroom also. Lives with  Wife and family very supportive. The Patient and/or patient representative patient and wife and family was provided with a choice of provider and agrees   with the discharge plan. [x] Yes [] No    Freedom of choice list was provided with basic dialogue that supports the patient's individualized plan of care/goals, treatment preferences and shares the quality data associated with the providers.  [x] Yes [] No                 Electronically signed by: Grace Ortega ZHANNA Escobedo, LSW on 6/23/2022 at 12:19 PM

## 2022-06-23 NOTE — CONSULTS
Cardiovascular Consult Note     TODAY'S DATE: 6/23/2022    Patient name: Elvira Lovelace   YOB: 1965  Date of admission:  6/22/2022       Patient seen, examined. Previous clinical entries reviewed. All available laboratory, imaging and ancillary data reviewed. Reason for Consult: PVCs. History of present Illness:     Elvira Lovelace is a 60 y.o. male with history of hyperlipidemia and PVCs presented to ER with abdominal pain and had an appendectomy done. He had some palpitations that is controlled. No new chest pain or shortness of breath. On monitor, no evidence of any significant PVCs. Past Medical History:    has a past medical history of Arrhythmia, Arthritis, Hyperlipidemia, Hypertension, Kidney stone, and Sleep apnea. Surgical History:     Past Surgical History:   Procedure Laterality Date    COLONOSCOPY  05/18/2017    HERNIA REPAIR      LITHOTRIPSY      NASAL POLYP SURGERY      PILONIDAL CYST EXCISION      TN COLONOSCOPY W/BIOPSY SINGLE/MULTIPLE N/A 5/18/2017    COLONOSCOPY WITH BIOPSY performed by Arnaud Bermudez MD at [address] [place] [address]       Medications:   Scheduled Meds:   piperacillin-tazobactam  3,375 mg IntraVENous Q8H    sodium chloride flush  5-40 mL IntraVENous Q12H    cyclobenzaprine  10 mg Oral TID    docusate sodium  100 mg Oral Daily     Continuous Infusions:   sodium chloride      sodium chloride Stopped (06/23/22 0527)      Outpatient Medications Marked as Taking for the 6/22/22 encounter [facility] Encounter)   Medication Sig Dispense Refill    acetaminophen (TYLENOL) 500 MG tablet Take 500 mg by mouth as needed for Pain      Vitamin D-Vitamin K (VITAMIN K2-VITAMIN D3 PO) Take 1 tablet by mouth daily         Allergies:   Patient has no known allergies. Social History:    reports that he has never smoked. He has never used smokeless tobacco. He reports current alcohol use. He reports that he does not use drugs.     Family History:    family history is not on file. Review of Systems:     Constitutional: No fever/chills. HENT: No headache, neck pain or neck stiffnes. No sore throat or dysphagia. Eyes: No blurred vision. Respiratory: As above. Cardiovascular: As above. Gastrointestinal: Negative. Genitourinary: Negative  Endocrine: Negative. Musculoskeletal: Negative. Skin: Negative. Allergic/Immunologic: Negative. Neurologic: Negative. Hematological: Negative. Psychiatric: Negative. All other systems are are noted to be otherwise negative. Physical Exam:   /61   Pulse 84   Temp 98.2 °F (36.8 °C) (Oral)   Resp 16   Ht 5' 9\" (1.753 m)   Wt 270 lb (122.5 kg)   SpO2 95%   BMI 39.87 kg/m²     Intake/Output Summary (Last 24 hours) at 6/23/2022 1547  Last data filed at 6/23/2022 0830  Gross per 24 hour   Intake 1595 ml   Output 25 ml   Net 1570 ml       GENERAL:  Alert, appropriate, oriented, in NAD. HEENT:  Head is atraumatic and normocephalic. No Pallor. No icterus. NECK: Supple without any thyromegaly. LUNGS: Generally clear to auscultation  CARDIAC: S1, S2, RRR. ABD:  Soft non-tender . EXT: No edema. MS: No obvious deformities. SKIN: No obvious skin rashes. NEURO: No focal neurologic deficits. Labs/ Ancillary data:     CBC:   Recent Labs     06/22/22 1815   WBC 13.4*   HGB 14.6        BMP:    Recent Labs     06/22/22 1815 06/23/22  0917    136   K 4.2 4.5    101   CO2 19* 25   BUN 18 14   CREATININE 0.79 0.94   GLUCOSE 93 135*     Hepatic:   Recent Labs     06/22/22 1815   AST 17   ALT 17   BILITOT 1.43*   ALKPHOS 60       Imaging:      Impression :     Acute appendicits - s/p Appendectomy. PVCs. Palpitations. Hyperlpidemia. Plan :     Continue Metoprolol on discharge. No further recommendations. Thank you very much for allowing us to participate in the care of this patient. Please call us with any questions.       Electronically signed by Lotus Garcia MD on 6/23/2022 at 3:49 PM

## 2022-06-23 NOTE — ANESTHESIA PRE PROCEDURE
Department of Anesthesiology  Preprocedure Note       Name:  Shanta Dickerson   Age:  60 y.o.  :  1965                                          MRN:  [id number]         Date:  2022      Surgeon: Nancy Hutchins): Kaye Rousseau MD    Procedure: Procedure(s):  APPENDECTOMY LAPAROSCOPIC    Medications prior to admission:   Prior to Admission medications    Medication Sig Start Date End Date Taking?  Authorizing Provider   metoprolol (TOPROL-XL) 25 MG XL tablet Take 25 mg by mouth daily    Historical Provider, MD   atorvastatin (LIPITOR) 40 MG tablet Take 40 mg by mouth daily    Historical Provider, MD   aspirin 325 MG tablet Take 325 mg by mouth daily    Historical Provider, MD   ibuprofen (ADVIL;MOTRIN) 600 MG tablet Take 1 tablet by mouth every 6 hours as needed for Pain 8/21/15   Christian Chacon MD       Current medications:    Current Facility-Administered Medications   Medication Dose Route Frequency Provider Last Rate Last Admin    piperacillin-tazobactam (ZOSYN) 3,375 mg in dextrose 5 % 50 mL IVPB extended infusion (mini-bag)  3,375 mg IntraVENous Q8H Rehman Mora, APRN - CNP        sodium chloride flush 0.9 % injection 5-40 mL  5-40 mL IntraVENous 2 times per day Rehman Mora, APRN - CNP        sodium chloride flush 0.9 % injection 10 mL  10 mL IntraVENous PRN Rehman Mora, APRN - CNP        0.9 % sodium chloride infusion   IntraVENous PRN Rehman Mora, APRN - CNP        0.9 % sodium chloride infusion   IntraVENous Continuous Rehman Mora, APRN - CNP   Paused at 22 0527    ondansetron (ZOFRAN) injection 4 mg  4 mg IntraVENous Q6H PRN Rehman Mora, APRN - CNP        morphine (PF) injection 2 mg  2 mg IntraVENous Q4H PRN Rehman Mora, APRN - CNP        Or    morphine sulfate (PF) injection 4 mg  4 mg IntraVENous Q4H PRN Rehman Mora, APRN - CNP         Current Outpatient Medications   Medication Sig Dispense Refill    metoprolol (TOPROL-XL) 25 MG XL tablet Take 25 mg by mouth daily      atorvastatin (LIPITOR) 40 MG tablet Take 40 mg by mouth daily      aspirin 325 MG tablet Take 325 mg by mouth daily      ibuprofen (ADVIL;MOTRIN) 600 MG tablet Take 1 tablet by mouth every 6 hours as needed for Pain 30 tablet 0       Allergies:  No Known Allergies    Problem List:    Patient Active Problem List   Diagnosis Code    Uncomplicated acute appendicitis K35.80       Past Medical History:        Diagnosis Date    Arrhythmia     Arthritis     Hyperlipidemia     Hypertension     Kidney stone     Sleep apnea        Past Surgical History:        Procedure Laterality Date    COLONOSCOPY  05/18/2017    HERNIA REPAIR      LITHOTRIPSY      NASAL POLYP SURGERY      PILONIDAL CYST EXCISION      AL COLONOSCOPY W/BIOPSY SINGLE/MULTIPLE N/A 5/18/2017    COLONOSCOPY WITH BIOPSY performed by Shayla Serrato MD at [facility] History:    Social History     Tobacco Use    Smoking status: Never Smoker    Smokeless tobacco: Never Used   Substance Use Topics    Alcohol use: Yes                                Counseling given: Not Answered      Vital Signs (Current):   Vitals:    06/23/22 0151 06/23/22 0200 06/23/22 0521 06/23/22 0522   BP:  119/67  (!) 140/71   Pulse: 71 64 91    Resp: 16 12 25    Temp:       TempSrc:       SpO2:       Weight:       Height:                                                  BP Readings from Last 3 Encounters:   06/23/22 (!) 140/71   06/02/22 108/62   05/18/17 124/72       NPO Status:                                                                                 BMI:   Wt Readings from Last 3 Encounters:   06/22/22 270 lb (122.5 kg)   06/02/22 272 lb (123.4 kg)   05/18/17 (!) 306 lb 10.6 oz (139.1 kg)     Body mass index is 39.87 kg/m².     CBC:   Lab Results   Component Value Date    WBC 13.4 06/22/2022    RBC 4.69 06/22/2022    RBC 4.38 12/08/2011    HGB 14.6 06/22/2022    HCT 47.0 06/22/2022    .2 06/22/2022    RDW 13.3 06/22/2022     06/22/2022     12/08/2011       CMP: Lab Results   Component Value Date     06/22/2022    K 4.2 06/22/2022     06/22/2022    CO2 19 06/22/2022    BUN 18 06/22/2022    CREATININE 0.79 06/22/2022    GFRAA >60 06/22/2022    LABGLOM >60 06/22/2022    GLUCOSE 93 06/22/2022    GLUCOSE 93 12/08/2011    PROT 7.6 06/22/2022    CALCIUM 8.7 06/22/2022    BILITOT 1.43 06/22/2022    ALKPHOS 60 06/22/2022    AST 17 06/22/2022    ALT 17 06/22/2022       POC Tests: No results for input(s): POCGLU, POCNA, POCK, POCCL, POCBUN, POCHEMO, POCHCT in the last 72 hours. Coags: No results found for: PROTIME, INR, APTT    HCG (If Applicable): No results found for: PREGTESTUR, PREGSERUM, HCG, HCGQUANT     ABGs: No results found for: PHART, PO2ART, [id number], OWZ1PGE, BEART, M2LSPTWJ     Type & Screen (If Applicable):  No results found for: LABABO, LABRH    Drug/Infectious Status (If Applicable):  No results found for: HIV, HEPCAB    COVID-19 Screening (If Applicable): No results found for: COVID19        Anesthesia Evaluation   no history of anesthetic complications:   Airway: Mallampati: II       Mouth opening: > = 3 FB   Dental:          Pulmonary:normal exam    (+) sleep apnea:                             Cardiovascular:  Exercise tolerance: good (>4 METS),   (+) hypertension:, hyperlipidemia    (-)  angina    ECG reviewed                        Neuro/Psych:               GI/Hepatic/Renal:   (+) renal disease: kidney stones, morbid obesity          Endo/Other:                     Abdominal:             Vascular: Other Findings:           Anesthesia Plan      general     ASA 3 - emergent       Induction: intravenous. MIPS: Postoperative opioids intended and Prophylactic antiemetics administered. Anesthetic plan and risks discussed with patient. Plan discussed with CRNA.     Attending anesthesiologist reviewed and agrees with Dayan Mcwilliams MD   6/23/2022

## 2022-06-23 NOTE — CONSULTS
General Surgery:  Consult Note        PATIENT NAME: Kaylynn Garsia OF BIRTH: 1965    ADMISSION DATE: 6/22/2022  5:50 PM     Admitting Provider: Abad Franklin Physician: Higinio Coelho DATE: 6/23/2022    Chief Complaint: Nominal pain  Consult Regarding: Acute appendicitis    HISTORY OF PRESENT ILLNESS:  The patient is a 60 y.o. male  who was admitted on 6/23/2022 complaining of abdominal pain. Patient reports he began having fatigue at work yesterday, and abdominal pain started at 2 PM.  This was associated with nausea without emesis. Patient otherwise denies CP, SOB, C/F. Patient denies is ever had this type of pain before. Patient reports the pain is always been in the right lower quadrant without radiation. CT abdomen/pelvis without contrast showed an abnormally thickened appendix with surrounding fat stranding and an appendicolith indicative of acute appendicitis. Leukocytosis 13.4. For this reason, general surgery was consulted. Patient reports he has had an specified hernia repair, no other abdominal surgeries. Does not take any anticoagulation. Last meal was before midnight. Past Medical History:        Diagnosis Date    Arrhythmia     Arthritis     Hyperlipidemia     Hypertension     Kidney stone     Sleep apnea        Past Surgical History:        Procedure Laterality Date    COLONOSCOPY  05/18/2017    HERNIA REPAIR      LITHOTRIPSY      NASAL POLYP SURGERY      PILONIDAL CYST EXCISION      [place] COLONOSCOPY W/BIOPSY SINGLE/MULTIPLE N/A 5/18/2017    COLONOSCOPY WITH BIOPSY performed by Ev Parekh MD at [address] [place] [address]       Medications Prior to Admission:   Not in a hospital admission. Allergies:  Patient has no known allergies.     Social History:   Social History     Socioeconomic History    Marital status:      Spouse name: Not on file    Number of children: Not on file    Years of education: Not on file    Highest education level: Not on file Occupational History    Not on file   Tobacco Use    Smoking status: Never Smoker    Smokeless tobacco: Never Used   Vaping Use    Vaping Use: Never used   Substance and Sexual Activity    Alcohol use: Yes    Drug use: No    Sexual activity: Not on file   Other Topics Concern    Not on file   Social History Narrative    Not on file     Social Determinants of Health     Financial Resource Strain:     Difficulty of Paying Living Expenses: Not on file   Food Insecurity:     Worried About Running Out of Food in the Last Year: Not on file    Óscar of Food in the Last Year: Not on file   Transportation Needs:     Lack of Transportation (Medical): Not on file    Lack of Transportation (Non-Medical): Not on file   Physical Activity:     Days of Exercise per Week: Not on file    Minutes of Exercise per Session: Not on file   Stress:     Feeling of Stress : Not on file   Social Connections:     Frequency of Communication with Friends and Family: Not on file    Frequency of Social Gatherings with Friends and Family: Not on file    Attends Holiness Services: Not on file    Active Member of [address] [place] [address] or Organizations: Not on file    Attends Club or Organization Meetings: Not on file    Marital Status: Not on file   Intimate Partner Violence:     Fear of Current or Ex-Partner: Not on file    Emotionally Abused: Not on file    Physically Abused: Not on file    Sexually Abused: Not on file   Housing Stability:     Unable to Pay for Housing in the Last Year: Not on file    Number of [place] in the Last Year: Not on file    Unstable Housing in the Last Year: Not on file       Family History:   History reviewed. No pertinent family history. REVIEW OF SYSTEMS:    CONSTITUTIONAL: Denies recent weight loss, fatigue, fevers, chills. HEENT: Denies rhinorrhea, dysphagia, odynphagia. CARDIOVASCULAR: Denies history of MI, recent chest pain.   Admits to palpitations  RESPIRATORY: Denies recent history of shortness of breath or history of PE. GASTROINTESTINAL: Mitts to abdominal pain and nausea, denies emesis. GENITOURINARY: Denies increased frequency or dysuria. HEMATOLOGIC/LYMPHATIC: Denies history of anemia or DVTs. ENDOCRINE: Denies history of thyroid problems or diabetes. NEURO: Denies history of CVA, TIA. Review of systems negative unless listed above. PHYSICAL EXAM:    VITALS:  BP (!) 140/71   Pulse 91   Temp 98 °F (36.7 °C) (Oral)   Resp 25   Ht 5' 9\" (1.753 m)   Wt 270 lb (122.5 kg)   SpO2 98%   BMI 39.87 kg/m²   INTAKE/OUTPUT:     Intake/Output Summary (Last 24 hours) at 6/23/2022 0558  Last data filed at 6/23/2022 0527  Gross per 24 hour   Intake 400 ml   Output --   Net 400 ml       CONSTITUTIONAL:  awake, alert, not distressed and moderately obese  HEENT: Normocephalic/atraumatic, without obvious abnormality. NECK:  Supple, symmetrical, trachea midline   CARDIOVASCULAR: Regular rate and rhythm without murmurs. LUNGS: Clear to auscultation bilaterally without evidence of wheezing or tachypnea. ABDOMEN: Obese, soft, nondistended, TTP in the RUQ at McBurney's point. MUSCULOSKELETAL: Muscle strength intact in all extremities bilaterally. NEUROLOGIC: CN II- XII intact. Gross motor intact without focal weakness. SKIN: No cyanosis, rashes, or edema noted.    Orientation:   oriented to person, place, and time    CBC with Differential:    Lab Results   Component Value Date    WBC 13.4 06/22/2022    RBC 4.69 06/22/2022    RBC 4.38 12/08/2011    HGB 14.6 06/22/2022    HCT 47.0 06/22/2022     06/22/2022     12/08/2011    .2 06/22/2022    MCH 31.1 06/22/2022    MCHC 31.1 06/22/2022    RDW 13.3 06/22/2022    LYMPHOPCT 7 06/22/2022    MONOPCT 7 06/22/2022    BASOPCT 0 06/22/2022    MONOSABS 0.96 06/22/2022    LYMPHSABS 1.00 06/22/2022    EOSABS 0.04 06/22/2022    BASOSABS 0.02 06/22/2022    DIFFTYPE NOT REPORTED 11/09/2021     CMP:    Lab Results   Component Value Date  06/22/2022    K 4.2 06/22/2022     06/22/2022    CO2 19 06/22/2022    BUN 18 06/22/2022    CREATININE 0.79 06/22/2022    GFRAA >60 06/22/2022    LABGLOM >60 06/22/2022    GLUCOSE 93 06/22/2022    GLUCOSE 93 12/08/2011    PROT 7.6 06/22/2022    LABALBU 4.1 06/22/2022    CALCIUM 8.7 06/22/2022    BILITOT 1.43 06/22/2022    ALKPHOS 60 06/22/2022    AST 17 06/22/2022    ALT 17 06/22/2022     BMP:    Lab Results   Component Value Date     06/22/2022    K 4.2 06/22/2022     06/22/2022    CO2 19 06/22/2022    BUN 18 06/22/2022    LABALBU 4.1 06/22/2022    CREATININE 0.79 06/22/2022    CALCIUM 8.7 06/22/2022    GFRAA >60 06/22/2022    LABGLOM >60 06/22/2022    GLUCOSE 93 06/22/2022    GLUCOSE 93 12/08/2011       Pertinent Radiology:   CT ABDOMEN PELVIS WO CONTRAST Additional Contrast? None    Result Date: 6/22/2022  EXAMINATION: CT OF THE ABDOMEN AND PELVIS WITHOUT CONTRAST 6/22/2022 6:17 pm TECHNIQUE: CT of the abdomen and pelvis was performed without the administration of intravenous contrast. Multiplanar reformatted images are provided for review. Automated exposure control, iterative reconstruction, and/or weight based adjustment of the mA/kV was utilized to reduce the radiation dose to as low as reasonably achievable. COMPARISON: 07/11/2017 HISTORY: ORDERING SYSTEM PROVIDED HISTORY: Right lower abdominal pain, history of kidney stone TECHNOLOGIST PROVIDED HISTORY: Right lower abdominal pain, history of kidney stone Decision Support Exception - unselect if not a suspected or confirmed emergency medical condition->Emergency Medical Condition (MA) Reason for Exam: RLQ pain for 2 hours. H/O hernia repair, previous lithotripsy FINDINGS: Lower Chest: The visualized heart and lungs show no acute abnormalities. Organs: Liver, spleen, pancreas, adrenal glands and gallbladder show no significant abnormalities. 3 mm nonobstructing calculus lower pole left kidney.   Exophytic 1.5 cm cyst posterior left kidney. GI/Bowel: There is limited evaluation due to absence of oral contrast. Stomach grossly normal.  Small bowel shows no obstruction or focal lesions. Terminal ileum unremarkable. Abnormally thickened appendix with periappendiceal inflammatory changes and appendicolith at the base. Compatible with acute appendicitis. Evaluation of the colon shows no significant abnormalities. Pelvis: Pelvic organs unremarkable. Peritoneum/Retroperitoneum: No ascites. No free intraperitoneal air. 7 mm reactive right lower quadrant lymph nodes. Bones/Soft Tissues: No acute abnormality of the bones. The superficial soft tissues show no significant abnormalities. 1. Findings compatible with uncomplicated acute appendicitis. 7 mm reactive lymph node adjacent. 2. Nonobstructing 3 mm left renal calculus and a 1.5 cm left renal cyst.     XR CHEST PORTABLE    Result Date: 6/22/2022  EXAMINATION: ONE XRAY VIEW OF THE CHEST 6/22/2022 6:24 pm COMPARISON: 03/24/2017 HISTORY: ORDERING SYSTEM PROVIDED HISTORY: Palpitations TECHNOLOGIST PROVIDED HISTORY: Palpitations Reason for Exam: palpitations FINDINGS: Normal cardiomediastinal silhouette. No pulmonary edema. No focal consolidation. No pleural effusion or pneumothorax. Small bandlike subsegmental atelectasis left lung base. No acute bony abnormality. Bandlike subsegmental atelectasis left lung base. ASSESSMENT:  Active Hospital Problems    Diagnosis Date Noted    Uncomplicated acute appendicitis [K35.80] 06/22/2022     Priority: Medium       1. 56M with acute appendicitis. Plan:  · Examined patient in person. Reviewed the images in person. Discussed the patient, history, physical, labs, imaging with the on-call attending. · Medical picture consistent with acute appendicitis. CT abdomen/pelvis shows a thickened appendix with surrounding fat stranding, and an appendicolith. Leukocytosis, RLQ abdominal pain.   · Plan for OR today for laparoscopic appendectomy, possible open, and all indicated procedures. · Consent reviewed and signed by the patient, witnessed. Discussed the procedure, alternatives, risk with the patient and all questions were answered. · N.p.o. since midnight, IVF  · Anticipate patient may be able to go home later today  · Continue medical management per primary      Electronically signed by Celso Ansari DO  on 6/23/2022 at 5:58 AM   Attending Physician Statement  I have discussed the case, including pertinent history and exam findings with the resident. I agree with the assessment, plan and orders as documented by the resident.     Procedure, risks discussed with the patient

## 2022-06-24 LAB
EKG ATRIAL RATE: 58 BPM
EKG P AXIS: 22 DEGREES
EKG P-R INTERVAL: 210 MS
EKG Q-T INTERVAL: 392 MS
EKG QRS DURATION: 82 MS
EKG QTC CALCULATION (BAZETT): 384 MS
EKG R AXIS: -6 DEGREES
EKG T AXIS: 25 DEGREES
EKG VENTRICULAR RATE: 58 BPM
SURGICAL PATHOLOGY REPORT: NORMAL

## 2022-06-24 PROCEDURE — 93010 ELECTROCARDIOGRAM REPORT: CPT | Performed by: INTERNAL MEDICINE

## 2022-06-28 ENCOUNTER — HOSPITAL ENCOUNTER (OUTPATIENT)
Dept: PHYSICAL THERAPY | Facility: CLINIC | Age: 57
Setting detail: THERAPIES SERIES
Discharge: HOME | End: 2022-06-28
Payer: COMMERCIAL

## 2022-06-28 LAB
CULTURE: NORMAL
CULTURE: NORMAL
Lab: NORMAL
Lab: NORMAL
SPECIMEN DESCRIPTION: NORMAL
SPECIMEN DESCRIPTION: NORMAL

## 2022-06-28 PROCEDURE — 97161 PT EVAL LOW COMPLEX 20 MIN: CPT

## 2022-06-28 NOTE — CONSULTS
[facility] Fall Risk Assessment    Patient Name:  Jackie Canela  : 1965    Risk Factor Scale  Score   History of Falls [x] Yes  [] No 25  0 25   Secondary Diagnosis [] Yes  [x] No 15  0 0   Ambulatory Aid [] Furniture  [] Crutches/cane/walker  [x] None/bedrest/wheelchair/nurse 30  15  0 0   IV/Heparin Lock [] Yes  [x] No 20  0 0   Gait/Transferring [] Impaired  [] Weak  [x] Normal/bedrest/immobile 20  10  0 0   Mental Status [] Forgets limitations  [x] Oriented to own ability 15  0 0      Total: 25     Based on the Assessment score: check the appropriate box.     []  No intervention needed   Low =   Score of 0-24    [x]  Use standard prevention interventions Moderate =  Score of 24-44   [x] Give patient handout and discuss fall prevention strategies   [x] Establish goal of education for patient/family RE: fall prevention strategies    []  Use high risk prevention interventions High = Score of 45 and higher   [] Give patient handout and discuss fall prevention strategies   [] Establish goal of education for patient/family Re: fall prevention strategies   [] Discuss lifeline / other resources    Electronically signed by:   Ayesha Gallegos PT  Date: 2022

## 2022-06-28 NOTE — CONSULTS
[] [facility]) - [facility] &  Therapy  [address]  [phone]  F: [phone] [x] [address] [facility] [address]   [address]  P: [phone]  F: [phone] [] [address] &  Therapy  [address]  P: [phone]  F: [phone] [] [address]  P: [phone]  F: [phone] [] [address]  [facility]   [address]   [place]: [phone]  F: [phone]      Physical Therapy Lower Extremity Evaluation    Date:  2022  Patient: Ney Dumont  : 1965  MRN: [id number]  Physician: Irene Mathews PA-C  Insurance: [employer]-out of state  Medical Diagnosis: Chondrocalcinosis of left knee (M11.262 [ICD-10-CM]); Knee effusion, left (M25.462 [ICD-10-CM])  Rehab Codes: M25.562, M25.662, M79.605, M79.652, M79.662  Onset date: May 2022  Next 's appt. : 22    Subjective:   CC/HPI: (onset date): Pt is a 60 y.o. male with cc of Left knee pain. 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 Aggravating factors include squatting, kneeling and prolonged standing. He notes considerable improvement since recent aspiration. He denies any locking or giving way but does report frequent clicking in the knee.       PMHx: [] Unremarkable [] Diabetes [] HTN  [] Pacemaker   [x] MI/Heart Problems-PVC's [] Cancer [x] Arthritis [x] Other:22: laparoscopic appendectomy              [x] Refer to full medical chart  In EPIC       Comorbidities:   [x] Obesity [] Dialysis  [] N/A   [] Asthma/COPD [] Dementia [] Other:   [] Stroke [] Sleep apnea [] Other:   [] Vascular disease [] Rheumatic disease [] Other:     Tests: [x] X-Ray:  KNEE X-RAY       4 views of the left knee including AP, bilateral tunnel, and lateral in    the upright position, and skyline views reveal anatomic alignment with no    fracture or dislocation.  Kellgren grade II/III changes of osteoarthritis    (joint space narrowing, osteophyte, subchondral sclerosis, bony    deformity/cyst) of the tricompartment(s).  No osseous loose bodies.  No    bony erosion or periosteal reaction.  No soft tissue masses.     Chondrocalcinosis noted       Impression: Chondrocalcinosis of the left knee. Medications: [x] Refer to full medical record [] None [] Other:  Allergies:      [x] Refer to full medical record [] None [] Other:    Function:  Hand Dominance  [] Right  [] Left  Employer contractor   Job Status []  Normal duty   [] Light duty   [x] Off due to condition-recent appendectomy    []  Retired   [] Not employed   [] Disability  [] Other:  []  Return to work: Work activities/duties /         Gait Prior level of function Current level of function    [x] Independent  [] Assist [x] Independent  [] Assist   Device: [x] Independent [x] Independent    [] Straight Cane [] Quad cane [] Straight Cane [] Quad cane    [] Standard walker [] Rolling walker   [] 4 wheeled walker [] Standard walker [] Rolling walker   [] 4 wheeled walker    [] Wheelchair [] Wheelchair        pain  yes   location Left knee   current: 0-10  1/10   pain at worst  pressure   pain type     what makes pain worse  overworking; kneeling   what makes pain better  rest   better/worse/same  better since injection   disturbed sleep? Objective:    ROM  ° A/P STRENGTH TESTS (+/-) Left Right Not Tested    Left Right Left Right Ant.  Drawer   []   Hip Flex 100  5  Post. Drawer   []   Ext     Lachmans   []   ER     Valgus Stress + for pain  []   IR     Varus Stress   []   ABD 30  4+  Prasannas Equiv  []   ADD     Apleys Comp.   []   Knee Flex 126 125 5  Apleys Dist.   []   Ext 0 0 4+  Hip Scouring   []   Ankle DF [facility] RUDDYs   [x]   PF     Piriformis   []   INV     Janettes   []   EVER     Talor Tilt   []        Pat-Fem Grind +  []       OBSERVATION No Deficit Deficit Not Tested Comments   Posture    Varus alignment   Palpation [] [x] [] Medial joint line   Sensation [] [] []    Edema/effusion [] [x] []    Crepitation [] [x] []    Patellar Mobility [x] [] []    Patellar Orientation [x] [] []    Gait [x] [] [] Analysis:non antalgic               FUNCTIONAL TESTS PAIN NO PAIN COMMENTS   Step Test 4 [] []    6 [] [x]    8 [] []    Squat [] [x]      Functional Test: LEFI Score: 55/80 (68.75%)  31% functionally impaired     Comments: knees are callused from kneeling regularly; Left: 43.8cm;   R 43.0cm    Assessment:       Pt would benefit from skilled PT interventions to decrease pain, increase strength, ROM, and overall functional mobility for improved quality of life. Problems:    [x] ? Pain:Left knee pain variable- 1-5/10  [x] ? Strength: decreased quad and glute medius strength  [x] ? Function:dififculty with squatting, kneeling, prolonged standing       STG: (to be met in 8 treatments)  1. ? Pain: reduce Left knee pain to 1/10 for ADL's and work tasks  2. ? Strength:improve Left quads to 5/5  3. ? Function: improve LEFI score for squatting to no difficulty  4. Patient to be independent with home exercise program as demonstrated by performance with correct form without cues. 5. Demonstrate Knowledge of fall prevention  LTG: (to be met in 16 treatments)  1. Improve LEFI for walking a mile to little diffilculty  2. Improve tolerance to prolonged standing  3.  Glute medius strength to 5/5                   Patient goals: strengthen knee    Rehab Potential:  [x] Good  [] Fair  [] Poor   Suggested Professional Referral:  [x] No  [] Yes:  Barriers to Goal Achievement:  [x] No  [] Yes:  Domestic Concerns:  [x] No  [] Yes:    Pt. Education:  [x] Plans/Goals, Risks/Benefits discussed  [] Home exercise program    Method of Education: [x] Verbal  [] Demo  [] Written  Comprehension of Education:  [x] Verbalizes understanding. [] Demonstrates understanding. [] Needs Review. [] Demonstrates/verbalizes understanding of HEP/Ed previously given. Treatment Plan:  [x] Therapeutic Exercise   84743  [] Iontophoresis: 4 mg/mL Dexamethasone Sodium Phosphate  mAmin  08590   [] Therapeutic Activity  21048 [x] Vasopneumatic cold with compression  43242    [] Gait Training   94461 [] Ultrasound   79035   [] Neuromuscular Re-education  23264 [] Electrical Stimulation Unattended  87075   [] Manual Therapy  11641 [] Electrical Stimulation Attended  09341   [x] Instruction in HEP  [] Lumbar/Cervical Traction  91609   [] Aquatic Therapy   64957 [x] Cold/hotpack    [] Massage   54813      [] Dry Needling, 1 or 2 muscles  95357   [] Biofeedback, first 15 minutes   59214  [] Biofeedback, additional 15 minutes   13817 [] Dry Needling, 3 or more muscles  13246     []  Medication allergies reviewed for use of    Dexamethasone Sodium Phosphate 4mg/ml     with iontophoresis treatments. Pt is not allergic.     Frequency:  2x/week for 16 visits        Todays Treatment:  Modalities:   Precautions:  Exercises: begin hip/knee strengthening next session  Exercise Reps/ Time Weight/ Level Comments                                 Other:    Specific Instructions for next treatment: hip/knee strengthening on [facility]      Evaluation Complexity:  History (Personal factors, comorbidities) [] 0 [x] 1-2 [] 3+   Exam (limitations, restrictions) [] 1-2 [x] 3 [] 4+   Clinical presentation (progression) [x] Stable [] Evolving  [] Unstable   Decision Making [x] Low [] Moderate [] High    [x] Low Complexity [] Moderate Complexity [] High Complexity       Treatment Charges: Mins Units   [x] Evaluation       [x]  Low       []  Moderate       []  High  1   []  Modalities     []  Ther Exercise     []  Manual Therapy     []  Ther Activities     []  Aquatics     []  Vasocompression     [] Other       TOTAL TREATMENT TIME: 45min    Time in: 7:07a   Time Out:7:58    Electronically signed by: Silvia Leahy PT        Physician Signature:________________________________Date:__________________  By signing above or cosigning this note, I have reviewed this plan of care and certify a need for medically necessary rehabilitation services.      *PLEASE SIGN ABOVE AND FAX BACK ALL PAGES*

## 2022-07-05 ENCOUNTER — HOSPITAL ENCOUNTER (OUTPATIENT)
Dept: PHYSICAL THERAPY | Facility: CLINIC | Age: 57
Setting detail: THERAPIES SERIES
Discharge: HOME | End: 2022-07-05
Payer: COMMERCIAL

## 2022-07-05 PROCEDURE — 97110 THERAPEUTIC EXERCISES: CPT

## 2022-07-05 NOTE — FLOWSHEET NOTE
[] [address]  [address]  [phone]  F: [phone] [x] [address]  [facility]   [address]  P: [phone]  F: [phone] [] [address]  [address]  P: [phone]  F: [phone] [] [address]  P: [phone]  F: [phone] [] [address]  [facility]   [address]   [place]: [phone]  F: [phone]      Physical Therapy Daily Treatment Note    Date:  2022  Patient Name:  Emiliano Johnson    :  1965  MRN: [id number]  Physician: Loyda Gómez PA-C                   Insurance: [facility]-out of state  Medical Diagnosis: Chondrocalcinosis of left knee (M11.262 [ICD-10-CM]); Knee effusion, left (M25.462 [ICD-10-CM])                        Rehab Codes: M25.562, M25.662, M79.605, M79.652, M79.662  Onset date: May 2022                       Next 's appt. : 22  Visit# / total visits: 2     Cancels/No Shows: 0  Subjective:    Pain:  [] Yes  [] No Location: Left knee Pain Rating: (0-10 scale)   1-2/10  Pain altered Tx:  [] No  [] Yes  Action:  Comments: mild soreness Left knee    Objective:  Modalities:   Precautions:  Exercises: begin hip/knee strengthening  Exercise Reps/ Time Weight/ Level Comments    True bike/Nu Step  5min L4               mat      SLR- 2x15 lime          clams 2x15 lime    SLR-side 2x15 lime    bridge 2x15 lime    HS stretch  3x30\"                   standing      Step ups 2x10 6\"    Step downs 2x10 6\"          Side stepping x6 25'-lime          Hip ext 2x10 blue    Hip ABD 2x10 blue                              Other:     Specific Instructions for next treatment: hip/knee strengthening on [facility]      Treatment Charges: Mins Units   []  Modalities     [x]  Ther Exercise 35 2   []  Manual Therapy     []  Ther Activities     []  Aquatics     []  Vasocompression     []  Other     Total Treatment time 35 2       Assessment: [] Progressing toward goals. [x] No change. second session- began open and closed chain exercises; provided written home program; verbal cues needed to avoid varus moment at knee during step downs     [] Other:  [x]  Pt would continue to benefit from skilled PT interventions to decrease pain, increase strength, ROM, and overall functional mobility for improved quality of life. STG/LTG  STG: (to be met in 8 treatments)  1. ? Pain: reduce Left knee pain to 1/10 for ADL's and work tasks  2. ? Strength:improve Left quads to 5/5  3. ? Function: improve LEFI score for squatting to no difficulty  4. Patient to be independent with home exercise program as demonstrated by performance with correct form without cues. 5. Demonstrate Knowledge of fall prevention  LTG: (to be met in 16 treatments)  1. Improve LEFI for walking a mile to little diffilculty  2. Improve tolerance to prolonged standing  3. Glute medius strength to 5/5                    Patient goals: strengthen knee       Pt. Education:  [] Yes  [] No  [] Reviewed Prior HEP/Ed  Method of Education: [] Verbal  [] Demo  [x] Written: Access Code: [email or web address]  URL: [email or web address]  Date: 07/05/2022  Prepared by: Kathy Hewitt P.T.     Exercises  Clamshell with Resistance - 1 x daily - 7 x weekly - 3 sets - 10 reps  Sidelying Hip Abduction - 1 x daily - 7 x weekly - 3 sets - 10 reps  Supine Bridge with Resistance Band - 1 x daily - 7 x weekly - 3 sets - 10 reps  Supine Active Straight Leg Raise - 1 x daily - 7 x weekly - 3 sets - 10 reps  Supine Hamstring Stretch with Strap - 1 x daily - 7 x weekly - 1 sets - 5 reps - 30sec hold  Forward Step Down - 1 x daily - 7 x weekly - 2 sets - 10 reps  Standing Hip Abduction with Anchored Resistance - 1 x daily - 7 x weekly - 2 sets - 10 reps  Standing Hip

## 2022-07-08 ENCOUNTER — APPOINTMENT (OUTPATIENT)
Dept: PHYSICAL THERAPY | Facility: CLINIC | Age: 57
End: 2022-07-08
Payer: COMMERCIAL

## 2022-07-12 ENCOUNTER — HOSPITAL ENCOUNTER (OUTPATIENT)
Dept: PHYSICAL THERAPY | Facility: CLINIC | Age: 57
Setting detail: THERAPIES SERIES
Discharge: HOME | End: 2022-07-12
Payer: COMMERCIAL

## 2022-07-12 PROCEDURE — 97110 THERAPEUTIC EXERCISES: CPT

## 2022-07-12 NOTE — FLOWSHEET NOTE
blue Bilaterally    Hip ABD 2x10 blue Bilaterally           Heel raises on step 20x   Added 7/12   Mini squats  10x2   Added 7/12   Other:     Specific Instructions for next treatment: hip/knee strengthening in [facility]      Treatment Charges: Mins Units   []  Modalities     [x]  Ther Exercise 39 3   []  Manual Therapy     []  Ther Activities     []  Aquatics     []  Vasocompression     []  Other     Total Treatment time 39 3       Assessment: [x] Progressing toward goals. Pt tolerated exercises well despite c/o feeling fluid on his knee. Added heel raises and squats with good completion. Following exercises pt reports feeling improved compared to when he came in. Did not advance further this date d/t pt reports of delayed muscle soreness last session, limiting his tolerance to his HEP. [] No change. [] Other:  [x]  Pt would continue to benefit from skilled PT interventions to decrease pain, increase strength, ROM, and overall functional mobility for improved quality of life. STG/LTG  STG: (to be met in 8 treatments)  1. ? Pain: reduce Left knee pain to 1/10 for ADL's and work tasks  2. ? Strength:improve Left quads to 5/5  3. ? Function: improve LEFI score for squatting to no difficulty  4. Patient to be independent with home exercise program as demonstrated by performance with correct form without cues. 5. Demonstrate Knowledge of fall prevention  LTG: (to be met in 16 treatments)  1. Improve LEFI for walking a mile to little diffilculty  2. Improve tolerance to prolonged standing  3. Glute medius strength to 5/5                    Patient goals: strengthen knee       Pt. Education:  [x] Yes  [] No  [x] Reviewed Prior HEP/Ed  Method of Education: [x] Verbal  [x] Demo  [x] Written: Access Code: [email or web address]  URL: [email or web address]  Date: 07/05/2022  Prepared by: Ana Rosa Benavidez P.T.     Exercises  Clamshell with Resistance - 1 x daily - 7 x weekly - 3 sets - 10 reps  Sidelying Hip Abduction - 1 x daily - 7 x weekly - 3 sets - 10 reps  Supine Bridge with Resistance Band - 1 x daily - 7 x weekly - 3 sets - 10 reps  Supine Active Straight Leg Raise - 1 x daily - 7 x weekly - 3 sets - 10 reps  Supine Hamstring Stretch with Strap - 1 x daily - 7 x weekly - 1 sets - 5 reps - 30sec hold  Forward Step Down - 1 x daily - 7 x weekly - 2 sets - 10 reps  Standing Hip Abduction with Anchored Resistance - 1 x daily - 7 x weekly - 2 sets - 10 reps  Standing Hip Extension with Anchored Resistance - 1 x daily - 7 x weekly - 2 sets - 10 reps  Mini Squat with Counter Support - 1 x daily - 7 x weekly - 2 sets - 10 reps    Comprehension of Education:  [] Verbalizes understanding. [] Demonstrates understanding. [] Needs review. [x] Demonstrates/verbalizes HEP/Ed previously given. Plan: [x] Continue current frequency toward long and short term goals.     [x] Specific Instructions for subsequent treatments: advance closed chain Ex's for knee/hip strengthening      Time In: 8:00am            Time Out: 8:44am    Electronically signed by:  Tony Bush PTA

## 2022-07-14 ENCOUNTER — APPOINTMENT (OUTPATIENT)
Dept: PHYSICAL THERAPY | Facility: CLINIC | Age: 57
End: 2022-07-14
Payer: COMMERCIAL

## 2022-07-14 ENCOUNTER — HOSPITAL ENCOUNTER (OUTPATIENT)
Age: 57
Setting detail: SPECIMEN
Discharge: HOME | End: 2022-07-14

## 2022-07-14 ENCOUNTER — OFFICE VISIT (OUTPATIENT)
Dept: ORTHOPEDIC SURGERY | Age: 57
End: 2022-07-14
Payer: COMMERCIAL

## 2022-07-14 VITALS
BODY MASS INDEX: 39.99 KG/M2 | HEART RATE: 71 BPM | WEIGHT: 270 LBS | HEIGHT: 69 IN | RESPIRATION RATE: 12 BRPM | DIASTOLIC BLOOD PRESSURE: 69 MMHG | SYSTOLIC BLOOD PRESSURE: 114 MMHG

## 2022-07-14 DIAGNOSIS — M25.462 KNEE EFFUSION, LEFT: ICD-10-CM

## 2022-07-14 DIAGNOSIS — M11.262 CHONDROCALCINOSIS OF LEFT KNEE: ICD-10-CM

## 2022-07-14 DIAGNOSIS — M11.262 CHONDROCALCINOSIS OF LEFT KNEE: Primary | ICD-10-CM

## 2022-07-14 LAB
ABSOLUTE EOS #: 0.3 K/UL (ref 0–0.44)
ABSOLUTE IMMATURE GRANULOCYTE: 0.08 K/UL (ref 0–0.3)
ABSOLUTE LYMPH #: 1.91 K/UL (ref 1.1–3.7)
ABSOLUTE MONO #: 1.2 K/UL (ref 0.1–1.2)
BASOPHILS # BLD: 0 % (ref 0–2)
BASOPHILS ABSOLUTE: 0.04 K/UL (ref 0–0.2)
C-REACTIVE PROTEIN: 65.1 MG/L (ref 0–5)
DIRECT EXAM: NORMAL
EOSINOPHILS RELATIVE PERCENT: 3 % (ref 1–4)
HCT VFR BLD CALC: 47.4 % (ref 40.7–50.3)
HEMOGLOBIN: 15.2 G/DL (ref 13–17)
IMMATURE GRANULOCYTES: 1 %
LYMPHOCYTES # BLD: 19 % (ref 24–43)
MCH RBC QN AUTO: 31.2 PG (ref 25.2–33.5)
MCHC RBC AUTO-ENTMCNC: 32.1 G/DL (ref 28.4–34.8)
MCV RBC AUTO: 97.3 FL (ref 82.6–102.9)
MONOCYTES # BLD: 12 % (ref 3–12)
NRBC AUTOMATED: 0 PER 100 WBC
PDW BLD-RTO: 13.2 % (ref 11.8–14.4)
PLATELET # BLD: 208 K/UL (ref 138–453)
PMV BLD AUTO: 10.3 FL (ref 8.1–13.5)
RBC # BLD: 4.87 M/UL (ref 4.21–5.77)
SEDIMENTATION RATE, ERYTHROCYTE: 17 MM/HR (ref 0–20)
SEG NEUTROPHILS: 65 % (ref 36–65)
SEGMENTED NEUTROPHILS ABSOLUTE COUNT: 6.54 K/UL (ref 1.5–8.1)
SPECIMEN DESCRIPTION: NORMAL
WBC # BLD: 10.1 K/UL (ref 3.5–11.3)

## 2022-07-14 PROCEDURE — 99213 OFFICE O/P EST LOW 20 MIN: CPT | Performed by: PHYSICIAN ASSISTANT

## 2022-07-14 RX ORDER — LIDOCAINE HYDROCHLORIDE 10 MG/ML
4 INJECTION, SOLUTION INFILTRATION; PERINEURAL ONCE
Status: COMPLETED | OUTPATIENT
Start: 2022-07-14 | End: 2022-07-14

## 2022-07-14 RX ADMIN — LIDOCAINE HYDROCHLORIDE 4 ML: 10 INJECTION, SOLUTION INFILTRATION; PERINEURAL at 10:34

## 2022-07-14 ASSESSMENT — ENCOUNTER SYMPTOMS
COLOR CHANGE: 0
CHEST TIGHTNESS: 0
VOMITING: 0
NAUSEA: 0
ABDOMINAL PAIN: 0
DIARRHEA: 0
APNEA: 0
ABDOMINAL DISTENTION: 0
COUGH: 0
RESPIRATORY NEGATIVE: 1
CONSTIPATION: 0
SHORTNESS OF BREATH: 0

## 2022-07-14 NOTE — PROGRESS NOTES
[address] [place] [address] AND SPORTS MEDICINE  [facility] Jenny Garcia  [address] [address]  Dept: [phone]  Dept Fax: [phone]        Ambulatory Follow Up      Subjective:   Asuncion Busby is a 60y.o. year old male who presents to our office today for routine followup regarding his   1. Chondrocalcinosis of left knee    2. Knee effusion, left    . Chief Complaint   Patient presents with    Knee Pain     L Knee (LI/Asp 6/2/22)       HPI Asuncion Busby  is a 60 y.o.  male who presents today in follow for left knee pain. The patient was last seen on 6/2/2022 and underwent treatment in the form of an aspiration and a cortisone injection. He was going to physical therapy. The patient notes 80% improvement with the previous treatment until Tuesday when he began having more pain. The knee swelled up and he had to use crutches. Review of Systems   Constitutional: Positive for activity change. Negative for appetite change, fatigue and fever. Respiratory: Negative. Negative for apnea, cough, chest tightness and shortness of breath. Cardiovascular: Negative. Negative for chest pain, palpitations and leg swelling. Gastrointestinal: Negative for abdominal distention, abdominal pain, constipation, diarrhea, nausea and vomiting. Genitourinary: Negative for difficulty urinating, dysuria and hematuria. Musculoskeletal: Positive for arthralgias, gait problem and joint swelling. Negative for myalgias. Skin: Negative for color change and rash. Neurological: Negative for dizziness, weakness, numbness and headaches. Psychiatric/Behavioral: Positive for sleep disturbance. Objective :   /69   Pulse 71   Resp 12   Ht 5' 9\" (1.753 m)   Wt 270 lb (122.5 kg)   BMI 39.87 kg/m²  Body mass index is 39.87 kg/m².   General: Asuncion Busby is a 60 y.o. male who is alert and oriented and sitting comfortably in our office. Orthopedics:    GENERAL: Alert and oriented X3 in no acute distress. SKIN: Intact without lesions or ulcerations. NEURO: Musculoskeletal and axillary nerves intact to sensory and motor testing. VASC: Capillary refill is less than 3 seconds. Knee Exam    LOCATION:  Left Knee  GEN: Alert and oriented X 3, in no acute distress. GAIT: The patient's gait was observed while entering the exam room and was noted to be antalgic. The extremity is in anatomic alignment. SKIN: Intact without rashes, lesions, or ulcerations. No obvious deformity or swelling. NEURO: The patient responds to light touch throughout bilateral LE. Patellar and Achilles reflexes are 2/4. VASC: The bilateral LE is neurovascularly intact with 2/4 DP and 2/4 PT pulses. Brisk capillary refill. ROM: 0/105 degrees. There is severe effusion. MUSC: decreased quad tone  LIGAMENT: There is  No varus instability at 0 degrees and No varus instability at 30 degrees. There is No valgus instability at 0 degrees and No valgus instability at 30 degrees. SPECIAL: Prasanna test is positive with no clunks, + crepitation, and + pain. PALP: There is medial joint line pain. Radiology: Previous x-rays reviewed    Procedure:  Joint aspiration of the left knee. The patient was placed in the supine position on the exam table. The superior lateral portal was identified and marked. The skin was prepped with betadine in a sterile fashion. Utilizing ultrasound for precise placement and clean technique 4cc's of  1% lidocaine  was injected. There was no resistance to the injection. Thereafter the skin was prepped with betadine in a sterile fashion once again and the joint was aspirated with a 60cc syringe. After aspirating the joint, 110 cc's of yellow cloudy tinged synovial fluid was removed from the knee. The wound was then cleansed and a band-aid was placed over the superior lateral portal site. The patient tolerated the procedure without difficulty. Adverse reactions to the aspiration were discussed with the patient including signs of infection (increasing pain, redness, swelling) and the patient was instructed to call immediately if experiencing any of these symptoms. Assessment:      1. Chondrocalcinosis of left knee    2. Knee effusion, left       Plan:      We discussed the etiologies of a large knee effusion. I do think we can drain it today and send the fluid to the lab to make sure its not gout or an infection. I then would like to get an MRI of his left knee to rule out a meniscal tear to determine if a knee arthroscopy would be beneficial.  The patient agreed to getting his knee aspirated today and then getting an MRI. The patient will follow-up after getting the MRI. Follow up:Return After MRI.           Orders Placed This Encounter   Medications    lidocaine 1 % injection 4 mL         Orders Placed This Encounter   Procedures    Culture, Anaerobic and Aerobic     Standing Status:   Future     Standing Expiration Date:   7/14/2023    Gram Stain     Standing Status:   Future     Standing Expiration Date:   7/14/2023     Order Specific Question:   Specify Specimen Type     Answer:   Fluid    MRI KNEE LEFT WO CONTRAST     Standing Status:   Future     Standing Expiration Date:   7/14/2023    Crystals, Body Fluid     Standing Status:   Future     Standing Expiration Date:   7/14/2023    Cell Count with Differential, Body Fluid     Standing Status:   Future     Standing Expiration Date:   7/14/2023     Order Specific Question:   Specify Specimen Type     Answer:   Fluid    CBC with Auto Differential     Standing Status:   Future     Number of Occurrences:   1     Standing Expiration Date:   7/14/2023    C-Reactive Protein     Standing Status:   Future     Number of Occurrences:   1     Standing Expiration Date:   7/14/2023    Sedimentation Rate     Standing Status:   Future     Number of Occurrences:   1     Standing Expiration Date:

## 2022-07-15 ENCOUNTER — HOSPITAL ENCOUNTER (OUTPATIENT)
Dept: PHYSICAL THERAPY | Facility: CLINIC | Age: 57
Setting detail: THERAPIES SERIES
Discharge: HOME | End: 2022-07-15
Payer: COMMERCIAL

## 2022-07-15 DIAGNOSIS — M25.462 KNEE EFFUSION, LEFT: Primary | ICD-10-CM

## 2022-07-15 LAB
CRYSTALS, FLUID: POSITIVE
LYMPHOCYTES, BODY FLUID: 0 %
NEUTROPHIL, FLUID: 96 %
OTHER CELLS FLUID: NORMAL %
RBC FLUID: <3000 /MM3
SPECIMEN TYPE: ABNORMAL
SPECIMEN TYPE: NORMAL
WBC FLUID: NORMAL /MM3

## 2022-07-15 RX ORDER — METHYLPREDNISOLONE 4 MG/1
TABLET ORAL
Qty: 1 KIT | Refills: 0 | Status: SHIPPED | OUTPATIENT
Start: 2022-07-15 | End: 2022-07-21

## 2022-07-15 NOTE — FLOWSHEET NOTE
[] [facility]) - [facility] &  Therapy  [address]    [phone]  F: [phone]   [x] [address]  [facility]   [address]  P: [phone]  F: [phone]  [] [address]  [address]  P: [phone]  F: [phone] [] [address]  P: [phone]  F: [phone]  [] [address] [facility]   [address]   [place]: [phone]  F: [phone]   [] [facility]  [address] [facility] [address] [facility] [address]  [place]: [phone]   F: [phone]     Physical Therapy Cancel/No Show note    Date: 7/15/2022  Patient: Bernabe Ochoa  : 1965  MRN: [id number]    Cancels/No Shows to date:     For today's appointment patient:    [x]  Cancelled    [] Rescheduled appointment    [] No-show     Reason given by patient:    []  Patient ill    []  Conflicting appointment    [] No transportation      [] Conflict with work    [] No reason given    [] Weather related    [] COVID-19    [x] Other:      Comments: Awaiting scheduling and results of MRI of LE- will call back to reschedule.         [] Next appointment was confirmed    Electronically signed by: Awa Burnham PT

## 2022-07-19 ENCOUNTER — APPOINTMENT (OUTPATIENT)
Dept: PHYSICAL THERAPY | Facility: CLINIC | Age: 57
End: 2022-07-19
Payer: COMMERCIAL

## 2022-07-19 LAB
CULTURE: ABNORMAL
DIRECT EXAM: ABNORMAL
DIRECT EXAM: ABNORMAL
SPECIMEN DESCRIPTION: ABNORMAL

## 2022-07-20 ENCOUNTER — HOSPITAL ENCOUNTER (OUTPATIENT)
Dept: MRI IMAGING | Facility: CLINIC | Age: 57
Discharge: HOME | End: 2022-07-22
Payer: COMMERCIAL

## 2022-07-20 DIAGNOSIS — M11.262 CHONDROCALCINOSIS OF LEFT KNEE: ICD-10-CM

## 2022-07-20 DIAGNOSIS — M25.462 KNEE EFFUSION, LEFT: ICD-10-CM

## 2022-07-20 PROCEDURE — 73721 MRI JNT OF LWR EXTRE W/O DYE: CPT

## 2022-07-21 ENCOUNTER — TELEPHONE (OUTPATIENT)
Dept: ORTHOPEDIC SURGERY | Age: 57
End: 2022-07-21

## 2022-07-21 ENCOUNTER — APPOINTMENT (OUTPATIENT)
Dept: PHYSICAL THERAPY | Facility: CLINIC | Age: 57
End: 2022-07-21
Payer: COMMERCIAL

## 2022-07-21 NOTE — TELEPHONE ENCOUNTER
----- Message from Ramu Bonilla PA-C sent at 7/21/2022 11:25 AM EDT -----  Needs an appointment for an MRI review with Dr. Kelly Ruiz to discuss left knee arthroscopy versus total knee arthroplasty.

## 2022-07-26 ENCOUNTER — APPOINTMENT (OUTPATIENT)
Dept: PHYSICAL THERAPY | Facility: CLINIC | Age: 57
End: 2022-07-26
Payer: COMMERCIAL

## 2022-07-28 ENCOUNTER — APPOINTMENT (OUTPATIENT)
Dept: PHYSICAL THERAPY | Facility: CLINIC | Age: 57
End: 2022-07-28
Payer: COMMERCIAL

## 2022-08-17 ENCOUNTER — OFFICE VISIT (OUTPATIENT)
Dept: ORTHOPEDIC SURGERY | Age: 57
End: 2022-08-17
Payer: COMMERCIAL

## 2022-08-17 VITALS
WEIGHT: 278 LBS | HEIGHT: 69 IN | DIASTOLIC BLOOD PRESSURE: 75 MMHG | SYSTOLIC BLOOD PRESSURE: 111 MMHG | HEART RATE: 68 BPM | BODY MASS INDEX: 41.18 KG/M2 | OXYGEN SATURATION: 98 %

## 2022-08-17 DIAGNOSIS — M25.562 LEFT KNEE PAIN, UNSPECIFIED CHRONICITY: ICD-10-CM

## 2022-08-17 DIAGNOSIS — M11.262 CHONDROCALCINOSIS OF LEFT KNEE: ICD-10-CM

## 2022-08-17 DIAGNOSIS — M25.462 KNEE EFFUSION, LEFT: Primary | ICD-10-CM

## 2022-08-17 PROCEDURE — 99213 OFFICE O/P EST LOW 20 MIN: CPT | Performed by: ORTHOPAEDIC SURGERY

## 2022-08-19 ASSESSMENT — ENCOUNTER SYMPTOMS
ROS SKIN COMMENTS: NEGATIVE FOR RASH
EYE DISCHARGE: 0
SHORTNESS OF BREATH: 0
ABDOMINAL PAIN: 0

## 2022-08-19 NOTE — PROGRESS NOTES
[address] [place] [address] AND SPORTS MEDICINE  [facility]  [address] [place] [address]  Dept: [phone]  Dept Fax: [phone]        Ambulatory Follow Up      Subjective:   Carole Pantoja is a 60y.o. year old male who presents to our office today for routine followup regarding his   1. Knee effusion, left    2. Chondrocalcinosis of left knee    3. Left knee pain, unspecified chronicity    . Chief Complaint   Patient presents with    Follow-up     MRI L knee       HPI  Peterson Kunz is a 25-year-old male who presents the office today for recheck of his left knee. He notes back in June he was working on a deck and both of his knees and he developed significant swelling to his left knee at that time. He had the knee aspirated and injected and that seemed to really help. He has had his knee aspirated since then 1 more time and notes that that also helped. He did physical therapy and noted no improvement. He states right now his knee feels the best that has since June. He has been modifying his activities and he is very careful with what he does then he notes overall his pain is 70% better. Review of Systems   Constitutional:  Positive for activity change. Negative for fever. HENT:  Negative for dental problem. Eyes:  Negative for discharge. Respiratory:  Negative for shortness of breath. Cardiovascular:  Negative for chest pain. Gastrointestinal:  Negative for abdominal pain. Genitourinary: Negative. Musculoskeletal:  Positive for arthralgias. Skin:         Negative for rash   Neurological:  Positive for weakness. Psychiatric/Behavioral:  Negative for confusion. I have reviewed the CC, HPI, ROS, PMH, FHX, Social History, and if not present in this note, I have reviewed in the patient's chart.    I agree with the documentation provided by other staff and have reviewed their documentation prior to providing my signature indicating agreement. Objective :   /75   Pulse 68   Ht 5' 9\" (1.753 m)   Wt 278 lb (126.1 kg)   SpO2 98%   BMI 41.05 kg/m²  Body mass index is 41.05 kg/m². General: Noemí Hodgson is a 60 y.o. male who is alert and oriented and sitting comfortably in our office. Ortho Exam  MS: Left knee shows some signs of infection. Minimal knee effusion is noted. Patient ambulates without antalgia or short weightbearing phase of the left lower extremity. Motor, sensory, vascular examination left lower extremity is grossly intact without focal deficits. Neuro: alert and oriented to person and place. Eyes: Extra-ocular muscles intact  Mouth: Oral mucosa moist. No perioral lesions  Pulm: Respirations unlabored and regular. Symmetric chest excursion without outward deformity is noted. Skin: warm, well perfused  Psych:   Patient has good fund of knowledge and displays understanging of exam, diagnosis, and plan. Radiology: MRI KNEE LEFT WO CONTRAST    Result Date: 7/21/2022  EXAMINATION: MRI OF THE LEFT KNEE WITHOUT CONTRAST, 7/20/2022 2:10 pm TECHNIQUE: Multiplanar multisequence MRI of the left knee was performed without the administration of intravenous contrast. COMPARISON: Left knee plain radiographs from 06/02/2022 HISTORY: ORDERING SYSTEM PROVIDED HISTORY: Chondrocalcinosis of left knee TECHNOLOGIST PROVIDED HISTORY: Reason for Exam: LEFT KNEE PAIN/ INSTABILITY/ SWELLING. DIFF WALKING W/ FULL PRESSURE. TIGHTNESS. CLICKING NOISES. LIMITED ROM. X 3MONTH 53-year-old male with left knee pain, instability and swelling. FINDINGS: MENISCI: Complex tearing, volume loss and outward extrusion of the medial meniscus. Vertical longitudinal tear involving the superior articular surface in the anterior horn lateral meniscus. CRUCIATE LIGAMENTS: Anterior cruciate ligament is somewhat irregular in appearance. ACL fibers appear continuous. Posterior cruciate ligament appears intact.  EXTENSOR MECHANISM: Mild is created with the assistance of a speech recognition program.  While intending to generate a document that actually reflects the content of the visit, the document can still have some errors including those of syntax and sound a like substitutions which may escape proof reading.   In such instances, actual meaning can be extrapolated by contextual diversion      Electronically signed by Shay Dobbins DO, Vergia Kobus on 8/19/2022 at 12:45 PM

## 2022-11-22 NOTE — DISCHARGE SUMMARY
[] [address]        Outpatient Physical                Therapy       [address]       Phone: [phone]       Fax: [phone] [x] [facility] at [address] [facility]       Phone: [phone]       Fax: [phone] [] [facility]     [address] [facility]     Phone: [phone]     Fax:  [phone]     Physical Therapy Discharge Note    Date: 2022      Patient: Noemí Hodgson  : 1965  MRN: [id number]    Physician: Kelly Fisher PA-C                   Insurance: [employer]-out of state  Medical Diagnosis: Chondrocalcinosis of left knee (M11.262 [ICD-10-CM]); Knee effusion, left (M25.462 [ICD-10-CM])                        Rehab Codes: M25.562, M25.662, M79.605, M79.652, M79.662  Onset date: May 2022                       Next 's appt. : 22  Visit# / total visits: 3/16    Cancels/No shows:  Date of initial visit: 22                Date of final visit: 22       Discharge Status:     Pt failed to make additional appointments for therapy. Pt. Is now discharged. Electronically signed by: Kelin Martinez PT    If you have any questions or concerns, please don't hesitate to call.   Thank you for your referral.

## 2023-03-01 ENCOUNTER — HOSPITAL ENCOUNTER (OUTPATIENT)
Age: 58
Setting detail: SPECIMEN
Discharge: HOME | End: 2023-03-01
Payer: COMMERCIAL

## 2023-03-01 LAB
ABSOLUTE EOS #: 0.21 K/UL (ref 0–0.44)
ABSOLUTE IMMATURE GRANULOCYTE: <0.03 K/UL (ref 0–0.3)
ABSOLUTE LYMPH #: 2.38 K/UL (ref 1.1–3.7)
ABSOLUTE MONO #: 0.71 K/UL (ref 0.1–1.2)
ANION GAP SERPL CALCULATED.3IONS-SCNC: 19 MMOL/L (ref 9–17)
BASOPHILS # BLD: 1 % (ref 0–2)
BASOPHILS ABSOLUTE: 0.04 K/UL (ref 0–0.2)
BUN SERPL-MCNC: 20 MG/DL (ref 6–20)
CALCIUM SERPL-MCNC: 9.9 MG/DL (ref 8.6–10.4)
CHLORIDE SERPL-SCNC: 109 MMOL/L (ref 98–107)
CO2 SERPL-SCNC: 21 MMOL/L (ref 20–31)
CREAT SERPL-MCNC: 0.82 MG/DL (ref 0.7–1.2)
EOSINOPHILS RELATIVE PERCENT: 2 % (ref 1–4)
GFR SERPL CREATININE-BSD FRML MDRD: >60 ML/MIN/1.73M2
GLUCOSE SERPL-MCNC: 98 MG/DL (ref 70–99)
HCT VFR BLD AUTO: 45.3 % (ref 40.7–50.3)
HGB BLD-MCNC: 14.9 G/DL (ref 13–17)
IMMATURE GRANULOCYTES: 0 %
INR PPP: 1.1
LYMPHOCYTES # BLD: 28 % (ref 24–43)
MCH RBC QN AUTO: 31 PG (ref 25.2–33.5)
MCHC RBC AUTO-ENTMCNC: 32.9 G/DL (ref 28.4–34.8)
MCV RBC AUTO: 94.2 FL (ref 82.6–102.9)
MONOCYTES # BLD: 8 % (ref 3–12)
NRBC AUTOMATED: 0 PER 100 WBC
PDW BLD-RTO: 13.9 % (ref 11.8–14.4)
PLATELET # BLD AUTO: 201 K/UL (ref 138–453)
PMV BLD AUTO: 9.9 FL (ref 8.1–13.5)
POTASSIUM SERPL-SCNC: 4.4 MMOL/L (ref 3.7–5.3)
PROTHROMBIN TIME: 13.7 SEC (ref 11.5–14.2)
RBC # BLD: 4.81 M/UL (ref 4.21–5.77)
SEG NEUTROPHILS: 61 % (ref 36–65)
SEGMENTED NEUTROPHILS ABSOLUTE COUNT: 5.26 K/UL (ref 1.5–8.1)
SODIUM SERPL-SCNC: 149 MMOL/L (ref 135–144)
WBC # BLD AUTO: 8.6 K/UL (ref 3.5–11.3)

## 2023-03-01 PROCEDURE — 36415 COLL VENOUS BLD VENIPUNCTURE: CPT

## 2023-03-01 PROCEDURE — 80048 BASIC METABOLIC PNL TOTAL CA: CPT

## 2023-03-01 PROCEDURE — 85610 PROTHROMBIN TIME: CPT

## 2023-03-01 PROCEDURE — 85025 COMPLETE CBC W/AUTO DIFF WBC: CPT

## (undated) DEVICE — GLOVE SURG SZ 75 CRM LTX FREE POLYISOPRENE POLYMER BEAD ANTI

## (undated) DEVICE — TROCARS: Brand: KII® BALLOON BLUNT TIP SYSTEM

## (undated) DEVICE — GLOVE SURG SZ 8 L12IN THK91MIL BRN LTX FREE POLYCHLOROPRENE

## (undated) DEVICE — TRAP POLYP ETRAP

## (undated) DEVICE — Device

## (undated) DEVICE — ADHESIVE SKIN CLOSURE TOP 36 CC HI VISC DERMBND MINI

## (undated) DEVICE — GLOVE SURG SZ 8 CRM LTX FREE POLYISOPRENE POLYMER BEAD ANTI

## (undated) DEVICE — NO USE 18 MONTHS GOWN ISOL XL YEL LF

## (undated) DEVICE — SUTURE VCRL SZ 4-0 L27IN ABSRB UD L19MM PS-2 3/8 CIR PRIM J426H

## (undated) DEVICE — YANKAUER,FLEXIBLE HANDLE,REGLR CAPACITY: Brand: MEDLINE INDUSTRIES, INC.

## (undated) DEVICE — BLANKET WRM W29.9XL79.1IN UP BODY FORC AIR MISTRAL-AIR

## (undated) DEVICE — TROCAR: Brand: KII FIOS FIRST ENTRY

## (undated) DEVICE — TROCAR: Brand: KII® SLEEVE

## (undated) DEVICE — SYSTEM EVAC SMOKE LAPARSCOPIC

## (undated) DEVICE — BAG SPEC REM 224ML W4XL6IN DIA10MM 1 HND GYN DISP ENDOPCH

## (undated) DEVICE — CUTTER ENDOSCP L340MM LIN ARTC SGL STROKE FIRING ENDOPATH

## (undated) DEVICE — RELOAD STPL SZ 0 L45MM DIA3.5MM 0DEG STD REG TISS BLU TI

## (undated) DEVICE — SUTURE SZ 0 27IN 5/8 CIR UR-6  TAPER PT VIOLET ABSRB VICRYL J603H

## (undated) DEVICE — INTENDED FOR TISSUE SEPARATION, AND OTHER PROCEDURES THAT REQUIRE A SHARP SURGICAL BLADE TO PUNCTURE OR CUT.: Brand: BARD-PARKER ® CARBON RIB-BACK BLADES

## (undated) DEVICE — SUTURE PDS II SZ 0 L27IN ABSRB VLT UR-6 L26MM 1/2 CIR D7185